# Patient Record
Sex: FEMALE | Race: WHITE | NOT HISPANIC OR LATINO | Employment: UNEMPLOYED | URBAN - METROPOLITAN AREA
[De-identification: names, ages, dates, MRNs, and addresses within clinical notes are randomized per-mention and may not be internally consistent; named-entity substitution may affect disease eponyms.]

---

## 2017-02-06 ENCOUNTER — CONVERSION ENCOUNTER (OUTPATIENT)
Dept: MAMMOGRAPHY | Facility: HOSPITAL | Age: 57
End: 2017-02-06

## 2018-03-26 ENCOUNTER — CONVERSION ENCOUNTER (OUTPATIENT)
Dept: MAMMOGRAPHY | Facility: HOSPITAL | Age: 58
End: 2018-03-26

## 2019-06-04 ENCOUNTER — HOSPITAL ENCOUNTER (OUTPATIENT)
Dept: MAMMOGRAPHY | Facility: HOSPITAL | Age: 59
Discharge: HOME | End: 2019-06-04
Attending: FAMILY MEDICINE

## 2020-06-25 ENCOUNTER — HOSPITAL ENCOUNTER (OUTPATIENT)
Dept: MAMMOGRAPHY | Facility: HOSPITAL | Age: 60
Discharge: HOME | End: 2020-06-25
Attending: FAMILY MEDICINE

## 2021-05-23 ENCOUNTER — TRANSCRIBE ORDERS (OUTPATIENT)
Dept: ADMINISTRATIVE | Facility: HOSPITAL | Age: 61
End: 2021-05-23

## 2021-05-23 DIAGNOSIS — Z12.31 ENCOUNTER FOR SCREENING MAMMOGRAM FOR MALIGNANT NEOPLASM OF BREAST: ICD-10-CM

## 2021-05-23 DIAGNOSIS — Z78.0 POST-MENOPAUSAL: Primary | ICD-10-CM

## 2021-05-23 DIAGNOSIS — Z12.31 ENCOUNTER FOR MAMMOGRAM TO ESTABLISH BASELINE MAMMOGRAM: ICD-10-CM

## 2021-06-28 ENCOUNTER — HOSPITAL ENCOUNTER (OUTPATIENT)
Dept: MAMMOGRAPHY | Facility: HOSPITAL | Age: 61
Discharge: HOME | End: 2021-06-28

## 2021-06-28 ENCOUNTER — HOSPITAL ENCOUNTER (OUTPATIENT)
Dept: BONE DENSITY | Facility: HOSPITAL | Age: 61
Discharge: HOME | End: 2021-06-28

## 2021-06-28 DIAGNOSIS — Z12.31 ENCOUNTER FOR SCREENING MAMMOGRAM FOR MALIGNANT NEOPLASM OF BREAST: ICD-10-CM

## 2021-06-28 DIAGNOSIS — Z78.0 POST-MENOPAUSAL: ICD-10-CM

## 2021-06-28 PROCEDURE — 77067 SCR MAMMO BI INCL CAD: CPT

## 2021-06-28 PROCEDURE — 77080 DXA BONE DENSITY AXIAL: CPT

## 2022-04-29 ENCOUNTER — TRANSCRIBE ORDERS (OUTPATIENT)
Dept: ADMINISTRATIVE | Facility: HOSPITAL | Age: 62
End: 2022-04-29

## 2022-04-29 DIAGNOSIS — Z12.31 VISIT FOR SCREENING MAMMOGRAM: Primary | ICD-10-CM

## 2022-06-29 ENCOUNTER — HOSPITAL ENCOUNTER (OUTPATIENT)
Dept: MAMMOGRAPHY | Facility: HOSPITAL | Age: 62
Discharge: HOME | End: 2022-06-29
Admitting: NURSE PRACTITIONER

## 2022-06-29 DIAGNOSIS — Z12.31 VISIT FOR SCREENING MAMMOGRAM: ICD-10-CM

## 2022-06-29 PROCEDURE — 77067 SCR MAMMO BI INCL CAD: CPT

## 2024-04-19 ENCOUNTER — TRANSCRIBE ORDERS (OUTPATIENT)
Dept: ADMINISTRATIVE | Facility: HOSPITAL | Age: 64
End: 2024-04-19
Payer: COMMERCIAL

## 2024-04-19 DIAGNOSIS — Z12.31 VISIT FOR SCREENING MAMMOGRAM: Primary | ICD-10-CM

## 2024-04-19 DIAGNOSIS — Z78.0 POST-MENOPAUSAL: ICD-10-CM

## 2024-04-23 ENCOUNTER — TRANSCRIBE ORDERS (OUTPATIENT)
Dept: DIABETES SERVICES | Facility: HOSPITAL | Age: 64
End: 2024-04-23
Payer: COMMERCIAL

## 2024-04-23 DIAGNOSIS — E11.9 TYPE 2 DIABETES MELLITUS WITHOUT COMPLICATION, UNSPECIFIED WHETHER LONG TERM INSULIN USE: Primary | ICD-10-CM

## 2024-06-12 NOTE — PROGRESS NOTES
Chief Complaint  Diabetes (New Patient, Establish care. )    Referred By: Taylor Brown*    Subjective          Caitlyn Nugent presents to [facility] for diabetes medication management    History of Present Illness    Visit type:  to establish care  Diabetes type:  Type 2  Age at time of dx/Year of dx/Number of years: reports she was diagnosed with type 2 diabetes at least 25 years ago  Family History of Diabetes: Mother and father  Current diabetes status/concerns/issues: Reports she was referred to our clinic by her primary care provider for management of her diabetes. Reports she is checking her glucose 1-2 times per day and her gluocose is running 62-195mg/dl. States she has been having nocturnal hypoglycemia. States she is symptomatic with the hypoglycemia with symptoms of shaky and diaphoresis. She brought a glucose log and she had hypoglycemia of 44 on 3/31/24 at 3:31 am, 62 on 4/16/24 at 8:30am, 56 on 4/17/24 at 08:30am,  65 on 4/11/24 at 4 pm, 67 on 3/28/24 at 9am.  States she would like to have a CGM for closer monitoring of her glucose levels.  States her A1c was 9% and she was able to get it down to 6% with diet, exercise and wt loss.   Other current health concerns: states she has been having hypotension. States her cardiologist dropped her cardizem from 240mg qd to 120mg qd. States she was placed on cymbalta for the neuropathy in her feet. She reports it has helped.   Current Diabetes symptoms:    Polyuria: No   Polydipsia: Yes     Polyphagia: No   Blurred vision: No   Excessive fatigue: Yes some  Known Diabetes complications:  Neuropathy: Numbness and Tingling; Location: Feet  Renal:  No labs available for review will call PCP for labs  Eyes: No current eye exam available in record; Location: N/A; Last Eye Exam:  7/23 ; Location: [facility]  Amputation/Wounds: None  GI: Constipation and Diarrhea  Cardiovascular: Hypertension and Hyperlipidemia  ED:  N/A  Other: None  Hospitalizations/ED/911 secondary to DM?  No  Hypoglycemia:  Level 1 hypoglycemia (54 mg/dL - 70 mg/dL); Frequency - multiple times per wk and Level 2 (less than 54 mg/dL); Frequency - one episode of 44   Hypoglycemia Symptoms:  shaking/tremors, dizziness, sweating, weakness, and lightheadedness  Current Diabetes treatment:  glucophage 1000mg bid, Ozempic 1mg once wk, lantus 30 units qd  Prior diabetes treatments:  januvia, basaglar  Using ACEI or ARB: Yes, Lisinopril, Managed by other provider  Using Statin: Yes, atorvastatin, Managed by other provider  Blood glucose device:  Meter  Blood glucose monitoring frequency:   2-4 times daily  Blood glucose range/average:      62-195mg/dl.  Glucose Source: BG Logs  Dietary behavior:  Limits high carb/sweet foods, Avoids sugary drinks, Number of meals each day - 2; Number of snacks each day - 1-2  Activity/Exercise:  None  Last Foot Exam: None  Diabetes Education Hx: Diabetes Nutrition Counseling  Social Determinants of Health: None    History reviewed. No pertinent past medical history.  Past Surgical History:   Procedure Laterality Date     SECTION      x2    POSTPARTUM TUBAL LIGATION      TONSILLECTOMY       Family History   Problem Relation Age of Onset    Breast cancer Mother     Diabetes Mother     Lung cancer Mother     Diabetes Father      Social History     Socioeconomic History    Marital status:    Tobacco Use    Smoking status: Never   Vaping Use    Vaping status: Never Used   Substance and Sexual Activity    Alcohol use: Never    Drug use: Never    Sexual activity: Not Currently     No Known Allergies    Current Outpatient Medications:     atorvastatin (LIPITOR) 20 MG tablet, Take 1 tablet by mouth Daily., Disp: , Rfl:     calcium carbonate (Calcium 600) 600 MG tablet, Take 1 tablet by mouth Daily., Disp: , Rfl:     carvedilol (COREG) 25 MG tablet, Take 1 tablet by mouth 2 (Two) Times a Day With Meals., Disp: , Rfl:      "cholecalciferol (D3-1000) 25 MCG (1000 UT) tablet, Take 1 tablet by mouth Daily., Disp: , Rfl:     Cyanocobalamin (B-12) 1000 MCG capsule, Take 1,000 mcg by mouth Daily., Disp: , Rfl:     dilTIAZem CD (CARDIZEM CD) 120 MG 24 hr capsule, Take 1 capsule by mouth Daily., Disp: , Rfl:     DULoxetine (CYMBALTA) 60 MG capsule, Take 1 capsule by mouth Daily., Disp: , Rfl:     Eliquis 5 MG tablet tablet, Take 1 tablet by mouth 2 (Two) Times a Day., Disp: , Rfl:     insulin glargine (Lantus) 100 UNIT/ML injection, Inject 30 Units under the skin into the appropriate area as directed Every Night., Disp: , Rfl:     Jardiance 25 MG tablet tablet, Take 1 tablet by mouth Daily., Disp: , Rfl:     levocetirizine (XYZAL) 5 MG tablet, Take 1 tablet by mouth Daily., Disp: , Rfl:     lisinopril (PRINIVIL,ZESTRIL) 2.5 MG tablet, Take 0.5 tablets by mouth Daily., Disp: , Rfl:     metFORMIN (GLUCOPHAGE) 1000 MG tablet, Take 1 tablet by mouth 2 (Two) Times a Day With Meals., Disp: , Rfl:     multivitamin with minerals (Centrum Silver 50+Women) tablet tablet, Take 1 tablet by mouth Daily., Disp: , Rfl:     omeprazole (priLOSEC) 40 MG capsule, Take 1 capsule by mouth Daily., Disp: , Rfl:     Ozempic, 1 MG/DOSE, 4 MG/3ML solution pen-injector, Inject 1 mg under the skin into the appropriate area as directed 1 (One) Time Per Week., Disp: , Rfl:     ZINC METHIONATE PO, Take 50 mg by mouth Daily., Disp: , Rfl:     Continuous Glucose Sensor (FreeStyle Steffi 3 Sensor) misc, Use 1 each Every 14 (Fourteen) Days., Disp: 2 each, Rfl: 5    Objective     Vitals:    06/13/24 0956   BP: 121/68   Pulse: 67   SpO2: 97%   Weight: 88.6 kg (195 lb 6.4 oz)   Height: 172.7 cm (68\")     Body mass index is 29.71 kg/m².    Physical Exam  Constitutional:       Appearance: Normal appearance. She is normal weight.      Comments: Overweight (BMI 25 - 29.9) Pt Current BMI = 29.71     HENT:      Head: Normocephalic and atraumatic.      Right Ear: External ear normal.      " Left Ear: External ear normal.      Nose: Nose normal.   Eyes:      Extraocular Movements: Extraocular movements intact.      Conjunctiva/sclera: Conjunctivae normal.   Pulmonary:      Effort: Pulmonary effort is normal.   Musculoskeletal:         General: Normal range of motion.      Cervical back: Normal range of motion.   Skin:     General: Skin is warm and dry.   Neurological:      General: No focal deficit present.      Mental Status: She is alert and oriented to person, place, and time. Mental status is at baseline.   Psychiatric:         Mood and Affect: Mood normal.         Behavior: Behavior normal.         Thought Content: Thought content normal.         Judgment: Judgment normal.             Result Review :   The following data was reviewed by: WINNIE Alcazar on 06/13/2024:    Most Recent A1C          6/13/2024    10:21   HGBA1C Most Recent   Hemoglobin A1C 6.3        A1C Last 3 Results          6/13/2024    10:21   HGBA1C Last 3 Results   Hemoglobin A1C 6.3      A1c collected in the office today is 6.3%, indicating Controlled Type II diabetes.    Glucose   Date Value Ref Range Status   06/13/2024 113 (H) 70 - 99 mg/dL Final     Comment:     Serial Number: [id number]:  [id number]     Point of care glucose in the office today is within normal limits for nonfasting glucose                Assessment: Reports she was referred to our clinic by her primary care provider for management of her diabetes. Reports she is checking her glucose 1-2 times per day and her gluocose is running 62-195mg/dl. States she has been having nocturnal hypoglycemia. States she is symptomatic with the hypoglycemia with symptoms of shaky and diaphoresis. She brought a glucose log and she had hypoglycemia of 44 on 3/31/24 at 3:31 am, 62 on 4/16/24 at 8:30am, 56 on 4/17/24 at 08:30am,  65 on 4/11/24 at 4 pm, 67 on 3/28/24 at 9am.  States she would like to have a CGM for closer monitoring of her glucose levels.  States  her A1c was 9% and she was able to get it down to 6% with diet, exercise and wt loss.  States she has met with a dietitian in the past.  Admits she is watching her carbohydrate and sugar intake.  Her A1c in the office today is 6.3% indicating controlled type 2 diabetes.      Diagnoses and all orders for this visit:    1. Controlled type 2 diabetes mellitus with hypoglycemia, with long-term current use of insulin (Primary)  -     POC Glycosylated Hemoglobin (Hb A1C)  -     Continuous Glucose Sensor (FreeStyle Mahi 3 Sensor) misc; Use 1 each Every 14 (Fourteen) Days.  Dispense: 2 each; Refill: 5    2. Overweight (BMI 25.0-29.9)    3. Type 2 diabetes mellitus with diabetic neuropathy, with long-term current use of insulin    Other orders  -     POC Glucose        Plan: A sample mahi 3 was placed on the right posterior arm and the john was downloaded for the patient in the office today.  A booklet on the mahi 3 was given to the patient and a prescription was sent to her pharmacy.  Scheduled a 6-week follow-up and we will review her CGM at that time.    The patient will monitor her blood glucose levels per CGM.  If she develops problematic hyperglycemia or hypoglycemia or adverse drug reactions, she will contact the office for further instructions.        Follow Up     Return in about 6 weeks (around 7/25/2024) for CGM Follow Up, Medication Mgmt.    Patient was given instructions and counseling regarding her condition or for health maintenance advice. Please see specific information pulled into the AVS if appropriate.     Sarahi Galaviz, WINNIE  06/13/2024      Dictated Utilizing Dragon Dictation.  Please note that portions of this note were completed with a voice recognition program.  Part of this note may be an electronic transcription/translation of spoken language to printed text using the Dragon Dictation System.

## 2024-06-13 ENCOUNTER — OFFICE VISIT (OUTPATIENT)
Dept: DIABETES SERVICES | Facility: HOSPITAL | Age: 64
End: 2024-06-13
Payer: COMMERCIAL

## 2024-06-13 ENCOUNTER — PRIOR AUTHORIZATION (OUTPATIENT)
Dept: DIABETES SERVICES | Facility: HOSPITAL | Age: 64
End: 2024-06-13

## 2024-06-13 VITALS
SYSTOLIC BLOOD PRESSURE: 121 MMHG | HEART RATE: 67 BPM | OXYGEN SATURATION: 97 % | WEIGHT: 195.4 LBS | BODY MASS INDEX: 29.61 KG/M2 | HEIGHT: 68 IN | DIASTOLIC BLOOD PRESSURE: 68 MMHG

## 2024-06-13 DIAGNOSIS — E11.40 TYPE 2 DIABETES MELLITUS WITH DIABETIC NEUROPATHY, WITH LONG-TERM CURRENT USE OF INSULIN: ICD-10-CM

## 2024-06-13 DIAGNOSIS — Z79.4 TYPE 2 DIABETES MELLITUS WITH DIABETIC NEUROPATHY, WITH LONG-TERM CURRENT USE OF INSULIN: ICD-10-CM

## 2024-06-13 DIAGNOSIS — E11.649 CONTROLLED TYPE 2 DIABETES MELLITUS WITH HYPOGLYCEMIA, WITH LONG-TERM CURRENT USE OF INSULIN: Primary | ICD-10-CM

## 2024-06-13 DIAGNOSIS — E11.65 UNCONTROLLED TYPE 2 DIABETES MELLITUS WITH HYPERGLYCEMIA: ICD-10-CM

## 2024-06-13 DIAGNOSIS — Z79.4 CONTROLLED TYPE 2 DIABETES MELLITUS WITH HYPOGLYCEMIA, WITH LONG-TERM CURRENT USE OF INSULIN: Primary | ICD-10-CM

## 2024-06-13 DIAGNOSIS — E66.3 OVERWEIGHT (BMI 25.0-29.9): ICD-10-CM

## 2024-06-13 LAB
EXPIRATION DATE: ABNORMAL
GLUCOSE BLDC GLUCOMTR-MCNC: 113 MG/DL (ref 70–99)
HBA1C MFR BLD: 6.3 % (ref 4.5–5.7)
Lab: ABNORMAL

## 2024-06-13 PROCEDURE — G0463 HOSPITAL OUTPT CLINIC VISIT: HCPCS | Performed by: NURSE PRACTITIONER

## 2024-06-13 PROCEDURE — 99204 OFFICE O/P NEW MOD 45 MIN: CPT | Performed by: NURSE PRACTITIONER

## 2024-06-13 PROCEDURE — 82948 REAGENT STRIP/BLOOD GLUCOSE: CPT | Performed by: NURSE PRACTITIONER

## 2024-06-13 PROCEDURE — 83036 HEMOGLOBIN GLYCOSYLATED A1C: CPT | Performed by: NURSE PRACTITIONER

## 2024-06-13 RX ORDER — CARVEDILOL 25 MG/1
25 TABLET ORAL 2 TIMES DAILY WITH MEALS
COMMUNITY

## 2024-06-13 RX ORDER — LEVOCETIRIZINE DIHYDROCHLORIDE 5 MG/1
5 TABLET, FILM COATED ORAL DAILY
COMMUNITY

## 2024-06-13 RX ORDER — SEMAGLUTIDE 1.34 MG/ML
1 INJECTION, SOLUTION SUBCUTANEOUS WEEKLY
COMMUNITY
Start: 2024-06-06

## 2024-06-13 RX ORDER — MULTIPLE VITAMINS W/ MINERALS TAB 9MG-400MCG
1 TAB ORAL DAILY
COMMUNITY

## 2024-06-13 RX ORDER — OMEPRAZOLE 40 MG/1
40 CAPSULE, DELAYED RELEASE ORAL DAILY
COMMUNITY

## 2024-06-13 RX ORDER — PHENOL 1.4 %
600 AEROSOL, SPRAY (ML) MUCOUS MEMBRANE DAILY
COMMUNITY

## 2024-06-13 RX ORDER — MELATONIN
1000 DAILY
COMMUNITY

## 2024-06-13 RX ORDER — DULOXETIN HYDROCHLORIDE 60 MG/1
60 CAPSULE, DELAYED RELEASE ORAL DAILY
COMMUNITY

## 2024-06-13 RX ORDER — INSULIN GLARGINE 100 [IU]/ML
30 INJECTION, SOLUTION SUBCUTANEOUS NIGHTLY
COMMUNITY

## 2024-06-13 RX ORDER — ATORVASTATIN CALCIUM 20 MG/1
20 TABLET, FILM COATED ORAL DAILY
COMMUNITY

## 2024-06-13 RX ORDER — BLOOD-GLUCOSE SENSOR
1 EACH MISCELLANEOUS
Qty: 2 EACH | Refills: 5 | Status: SHIPPED | OUTPATIENT
Start: 2024-06-13

## 2024-06-13 RX ORDER — APIXABAN 5 MG/1
5 TABLET, FILM COATED ORAL 2 TIMES DAILY
COMMUNITY

## 2024-06-13 RX ORDER — LISINOPRIL 2.5 MG/1
0.5 TABLET ORAL DAILY
COMMUNITY
Start: 2024-06-11

## 2024-06-13 RX ORDER — DILTIAZEM HYDROCHLORIDE 120 MG/1
120 CAPSULE, COATED, EXTENDED RELEASE ORAL DAILY
COMMUNITY

## 2024-06-13 RX ORDER — EMPAGLIFLOZIN 25 MG/1
25 TABLET, FILM COATED ORAL DAILY
COMMUNITY

## 2024-06-13 NOTE — TELEPHONE ENCOUNTER
PA was sent to Plan and was approved.    Key: [id number]    FreeStyle Steffi 3 Sensor  PA Case ID #: [id number]  Rx #: [id number]      Outcome  Approved today  PA Case: [id number], Status: Approved, Coverage Starts on: 6/13/2024 12:00:00 AM, Coverage Ends on: 6/13/2025 12:00:00 AM.  Authorization Expiration Date: 6/12/2025

## 2024-06-14 ENCOUNTER — PATIENT ROUNDING (BHMG ONLY) (OUTPATIENT)
Dept: DIABETES SERVICES | Facility: HOSPITAL | Age: 64
End: 2024-06-14
Payer: COMMERCIAL

## 2024-06-14 NOTE — PROGRESS NOTES
June 14, 2024    Hello, may I speak with Caitlyn Nugent?    My name is Dian      I am  with [facility]  [address] [place] [address] [place] [address] [phone].    Before we get started may I verify your date of birth? 1960    I am calling to officially welcome you to our practice and ask about your recent visit. Is this a good time to talk? no    Tell me about your visit with us. What things went well?  Left voicemail per script welcoming the pt to the clinic with clinic information.        We're always looking for ways to make our patients' experiences even better. Do you have recommendations on ways we may improve?  no    Overall were you satisfied with your first visit to our practice? yes       I appreciate you taking the time to speak with me today. Is there anything else I can do for you? no      Thank you, and have a great day.

## 2024-07-01 ENCOUNTER — OFFICE VISIT (OUTPATIENT)
Dept: SURGERY | Facility: CLINIC | Age: 64
End: 2024-07-01
Payer: COMMERCIAL

## 2024-07-01 ENCOUNTER — PREP FOR SURGERY (OUTPATIENT)
Dept: OTHER | Facility: HOSPITAL | Age: 64
End: 2024-07-01
Payer: COMMERCIAL

## 2024-07-01 VITALS
HEART RATE: 71 BPM | HEIGHT: 68 IN | DIASTOLIC BLOOD PRESSURE: 83 MMHG | WEIGHT: 192 LBS | BODY MASS INDEX: 29.1 KG/M2 | SYSTOLIC BLOOD PRESSURE: 140 MMHG

## 2024-07-01 DIAGNOSIS — Z12.11 SCREENING FOR MALIGNANT NEOPLASM OF COLON: Primary | ICD-10-CM

## 2024-07-01 PROCEDURE — S0285 CNSLT BEFORE SCREEN COLONOSC: HCPCS | Performed by: NURSE PRACTITIONER

## 2024-07-01 RX ORDER — SODIUM CHLORIDE 0.9 % (FLUSH) 0.9 %
3 SYRINGE (ML) INJECTION EVERY 12 HOURS SCHEDULED
OUTPATIENT
Start: 2024-07-01

## 2024-07-01 RX ORDER — POLYETHYLENE GLYCOL 3350 17 G/17G
POWDER, FOR SOLUTION ORAL
Qty: 238 PACKET | Refills: 0 | Status: SHIPPED | OUTPATIENT
Start: 2024-07-01

## 2024-07-01 RX ORDER — CHLORAL HYDRATE 500 MG
CAPSULE ORAL
COMMUNITY

## 2024-07-01 RX ORDER — SODIUM CHLORIDE 0.9 % (FLUSH) 0.9 %
10 SYRINGE (ML) INJECTION AS NEEDED
OUTPATIENT
Start: 2024-07-01

## 2024-07-01 RX ORDER — SODIUM CHLORIDE 9 MG/ML
40 INJECTION, SOLUTION INTRAVENOUS AS NEEDED
OUTPATIENT
Start: 2024-07-01

## 2024-07-01 NOTE — PROGRESS NOTES
Chief Complaint: Colonoscopy    Subjective      Colonoscopy consultation       History of Present Illness  Caitlyn Nugent is a 63 y.o. female presents to [facility] for colonoscopy consultation.    Patient presents today on referral from Taylor Galvin for colonoscopy consultation.  Patient denies any abdominal pain, change in bowel habit, or rectal bleeding.  Denies any family history of colorectal cancer.    Patient reports that she stopped Ozempic 3 weeks ago due to pain and constipation which has resolved.    She denies PRISCILA.    Denies taking a GLP-1 receptors.    Admits to taking Eliquis daily for A-fib.  She is under the care of Lore ZHOU.  Get cardiac clearance prior to the procedure.    : Colonoscopy (Mata): Normal colon.    : colonoscopy (Mata): normal colon.   IObjective     Past Medical History:   Diagnosis Date    Cancer Basal cancer on my face.    Diabetes mellitus     Hypertension        Past Surgical History:   Procedure Laterality Date     SECTION      x2    POSTPARTUM TUBAL LIGATION      TONSILLECTOMY         Outpatient Medications Marked as Taking for the 24 encounter (Office Visit) with Binta Quesada, APRDELIA   Medication Sig Dispense Refill    atorvastatin (LIPITOR) 20 MG tablet Take 1 tablet by mouth Daily.      calcium carbonate (Calcium 600) 600 MG tablet Take 1 tablet by mouth Daily.      carvedilol (COREG) 25 MG tablet Take 1 tablet by mouth 2 (Two) Times a Day With Meals.      cholecalciferol (D3-1000) 25 MCG (1000 UT) tablet Take 1 tablet by mouth Daily.      Continuous Glucose Sensor (FreeStyle Steffi 3 Sensor) [facility] Use 1 each Every 14 (Fourteen) Days. 2 each 5    Cyanocobalamin (B-12) 1000 MCG capsule Take 1,000 mcg by mouth Daily.      dilTIAZem CD (CARDIZEM CD) 120 MG 24 hr capsule Take 1 capsule by mouth Daily.      DULoxetine (CYMBALTA) 60 MG capsule Take 1 capsule by mouth Daily.      Eliquis 5 MG tablet tablet Take 1  "tablet by mouth 2 (Two) Times a Day.      insulin glargine (Lantus) 100 UNIT/ML injection Inject 30 Units under the skin into the appropriate area as directed Every Night.      Jardiance 25 MG tablet tablet Take 1 tablet by mouth Daily.      levocetirizine (XYZAL) 5 MG tablet Take 1 tablet by mouth Daily.      metFORMIN (GLUCOPHAGE) 1000 MG tablet Take 1 tablet by mouth 2 (Two) Times a Day With Meals.      multivitamin with minerals (Centrum Silver 50+Women) tablet tablet Take 1 tablet by mouth Daily.      omeprazole (priLOSEC) 40 MG capsule Take 1 capsule by mouth Daily.      ZINC METHIONATE PO Take 50 mg by mouth Daily.         No Known Allergies     Family History   Problem Relation Age of Onset    Breast cancer Mother     Diabetes Mother     Lung cancer Mother     Cancer Mother         Lung, breast, brain cancer    Hypertension Mother     Diabetes Father     Hypertension Father     Kidney disease Father         Was on dialysis 8 yrs.       Social History     Socioeconomic History    Marital status:    Tobacco Use    Smoking status: Never    Smokeless tobacco: Never   Vaping Use    Vaping status: Never Used   Substance and Sexual Activity    Alcohol use: Not Currently    Drug use: Never    Sexual activity: Not Currently     Partners: Male     Birth control/protection: None, Tubal ligation       Review of Systems   Constitutional:  Negative for chills and fever.   Gastrointestinal:  Negative for abdominal distention, abdominal pain, anal bleeding, blood in stool, constipation, diarrhea and rectal pain.        Vital Signs:   /83 (BP Location: Right arm, Patient Position: Sitting, Cuff Size: Adult)   Pulse 71   Ht 172.7 cm (68\")   Wt 87.1 kg (192 lb)   BMI 29.19 kg/m²      Physical Exam  Vitals and nursing note reviewed.   Constitutional:       General: She is not in acute distress.     Appearance: Normal appearance. She is not ill-appearing.   HENT:      Head: Normocephalic and atraumatic. "   Cardiovascular:      Rate and Rhythm: Normal rate.   Pulmonary:      Effort: Pulmonary effort is normal.      Breath sounds: No stridor.   Abdominal:      Palpations: Abdomen is soft.      Tenderness: There is no guarding.   Musculoskeletal:         General: No deformity. Normal range of motion.   Skin:     General: Skin is warm and dry.      Coloration: Skin is not jaundiced.   Neurological:      General: No focal deficit present.      Mental Status: She is alert and oriented to person, place, and time.   Psychiatric:         Mood and Affect: Mood normal.         Thought Content: Thought content normal.          Result Review :          []  Laboratory  []  Radiology  []  Pathology  []  Microbiology  []  EKG/Telemetry   []  Cardiology/Vascular   []  Old records  I spent 15 minutes caring for Caitlyn on this date of service. This time includes time spent by me in the following activities: reviewing tests, obtaining and/or reviewing a separately obtained history, performing a medically appropriate examination and/or evaluation, ordering medications, tests, or procedures, and documenting information in the medical record     Assessment and Plan    Diagnoses and all orders for this visit:    1. Screening for malignant neoplasm of colon (Primary)    Other orders  -     polyethylene glycol (MIRALAX) 17 g packet; Take as directed.  Instructions given in office.  Dispense: 238 g bottle  Dispense: 238 packet; Refill: 0    Hold Eliquis starting 8/3/2024.    Cardiac clearance from Lore Epps.    Lantus: 8/5: 0 units; 8/4: 10 units      Follow Up   Return for Schedule colonoscopy with Dr. Baires on 8/5/2024 [facility].    Hospital arrival time: 0930    Possible risks/complications, benefits, and alternatives to surgical or invasive procedures have been explained to patient and/or legal guardian.    Patient has been evaluated and can tolerate anesthesia and/or sedation. Risks, benefits, and alternatives to  anesthesia and sedation have been explained to the patient and/or legal guardian. Patient verbalizes understanding and is willing to proceed with the above plan.     Patient was given instructions and counseling regarding her condition or for health maintenance advice. Please see specific information pulled into the AVS if appropriate.

## 2024-07-22 ENCOUNTER — HOSPITAL ENCOUNTER (OUTPATIENT)
Dept: MAMMOGRAPHY | Facility: HOSPITAL | Age: 64
Discharge: HOME | End: 2024-07-22
Payer: COMMERCIAL

## 2024-07-22 ENCOUNTER — HOSPITAL ENCOUNTER (OUTPATIENT)
Dept: BONE DENSITY | Facility: HOSPITAL | Age: 64
Discharge: HOME | End: 2024-07-22
Payer: COMMERCIAL

## 2024-07-22 DIAGNOSIS — Z78.0 POST-MENOPAUSAL: ICD-10-CM

## 2024-07-22 DIAGNOSIS — Z12.31 VISIT FOR SCREENING MAMMOGRAM: ICD-10-CM

## 2024-07-22 PROCEDURE — 77080 DXA BONE DENSITY AXIAL: CPT

## 2024-07-22 PROCEDURE — 77067 SCR MAMMO BI INCL CAD: CPT

## 2024-07-22 PROCEDURE — 77063 BREAST TOMOSYNTHESIS BI: CPT

## 2024-07-25 ENCOUNTER — TELEPHONE (OUTPATIENT)
Dept: SURGERY | Facility: CLINIC | Age: 64
End: 2024-07-25
Payer: COMMERCIAL

## 2024-07-25 NOTE — TELEPHONE ENCOUNTER
Pt is aware that we received her clearance back and its ok t old her Eliquis 2 days prior to her scope. Pt V/U.

## 2024-07-30 NOTE — PAT
Reminded of arrival time at    0930AM     , Entrance C of the [facility] hospital. Instructed to bring or have a  over the age of 18 set up to drive you home the day of procedure.    Instructed on clear liquid diet the day before, nothing red or purple. Call with any questions about the prep or if in need of the prep.  Reminded them not to eat or drink anything am of procedure unless its a sip of water with medications.      PT TO STOP ELIQUIS FRIDAY BEFORE

## 2024-07-31 NOTE — PROGRESS NOTES
Chief Complaint  Diabetes (Follow up, med mgt, CGM albina, Stopped Ozempic 06/19)    Referred By: Abel Garcia MD    Subjective          Caitlyn Nugent presents to [facility] for diabetes medication management    History of Present Illness    Visit type:  follow-up  Diabetes type:  Type 2  Current diabetes status/concerns/issues:  Reports she discontinued Ozempic due to constipation. States it was so bad she thought she was going to have to go to the hospital. She has been off Ozempic since 6/19/24. Reports the constipation resolved for the most part. States her appetite has increased since being off Ozempic. States her blood sugar has been in the low 100s on awakening. States she is watching her intake of sugar. States the only sugar she eats is natural sugar in fruit.   Other health concerns: No new health concerns  Current Diabetes symptoms:    Polyuria: Yes     Polydipsia: Yes     Polyphagia: No   Blurred vision: No   Excessive fatigue: No  Known Diabetes complications:  Neuropathy: Numbness and Tingling; Location: Feet  Renal:  No labs available for review will call PCP for labs  Eyes: No current eye exam available in record; Location: N/A; Last Eye Exam:  7/23 ; Location: [facility]  Amputation/Wounds: None  GI: Constipation and Diarrhea  Cardiovascular: Hypertension and Hyperlipidemia  ED: N/A  Other: None  Hypoglycemia:  None reported at this time and 0% per CGM  Hypoglycemia Symptoms:  No hypoglycemia at this time  Current diabetes treatment:  glucophage 1000mg bid, lantus 20 units qd   Blood glucose device:  Steffi CGM  Blood glucose monitoring frequency:  Continuous per CGM  Blood glucose range/average:  The 14-day sensor report shows an average glucose of 159 mg/dL, with 72% in target range ( mgdL), 22% in the high range (181-250 mg/dL), 6% in the very high range (>250 mg/dL), 0% in the low range (54-70 mg/dL) and 0% in the very low range (<54 mg/dL).    Glucose Source: Device Reviewed  Dietary behavior:  Limits high carb/sweet foods, Avoids sugary drinks, Number of meals each day - 2; Number of snacks each day - 1-2  Activity/Exercise:  None  Past Medical History:   Diagnosis Date    Cancer Basal cancer on my face.    Diabetes mellitus     Hypertension      Past Surgical History:   Procedure Laterality Date     SECTION      x2    POSTPARTUM TUBAL LIGATION      TONSILLECTOMY       Family History   Problem Relation Age of Onset    Breast cancer Mother     Diabetes Mother     Lung cancer Mother     Cancer Mother         Lung, breast, brain cancer    Hypertension Mother     Diabetes Father     Hypertension Father     Kidney disease Father         Was on dialysis 8 yrs.     Social History     Socioeconomic History    Marital status:    Tobacco Use    Smoking status: Never    Smokeless tobacco: Never   Vaping Use    Vaping status: Never Used   Substance and Sexual Activity    Alcohol use: Not Currently    Drug use: Never    Sexual activity: Not Currently     Partners: Male     Birth control/protection: None, Tubal ligation     No Known Allergies    Current Outpatient Medications:     atorvastatin (LIPITOR) 20 MG tablet, Take 1 tablet by mouth Daily., Disp: , Rfl:     calcium carbonate (Calcium 600) 600 MG tablet, Take 1 tablet by mouth Daily., Disp: , Rfl:     carvedilol (COREG) 25 MG tablet, Take 1 tablet by mouth 2 (Two) Times a Day With Meals., Disp: , Rfl:     cholecalciferol (D3-1000) 25 MCG (1000 UT) tablet, Take 1 tablet by mouth Daily., Disp: , Rfl:     Continuous Glucose Sensor (FreeStyle Steffi 3 Sensor) misc, Use 1 each Every 14 (Fourteen) Days., Disp: 2 each, Rfl: 5    Cyanocobalamin (B-12) 1000 MCG capsule, Take 1,000 mcg by mouth Daily., Disp: , Rfl:     dilTIAZem CD (CARDIZEM CD) 120 MG 24 hr capsule, Take 1 capsule by mouth Daily., Disp: , Rfl:     DULoxetine (CYMBALTA) 60 MG capsule, Take 1 capsule by mouth Daily., Disp: , Rfl:      "Eliquis 5 MG tablet tablet, Take 1 tablet by mouth 2 (Two) Times a Day., Disp: , Rfl:     insulin glargine (Lantus) 100 UNIT/ML injection, Inject 20 Units under the skin into the appropriate area as directed Every Night., Disp: , Rfl:     Jardiance 25 MG tablet tablet, Take 1 tablet by mouth Daily., Disp: , Rfl:     levocetirizine (XYZAL) 5 MG tablet, Take 1 tablet by mouth Daily., Disp: , Rfl:     metFORMIN (GLUCOPHAGE) 1000 MG tablet, Take 1 tablet by mouth 2 (Two) Times a Day With Meals., Disp: , Rfl:     multivitamin with minerals (Centrum Silver 50+Women) tablet tablet, Take 1 tablet by mouth Daily., Disp: , Rfl:     omeprazole (priLOSEC) 40 MG capsule, Take 1 capsule by mouth Daily., Disp: , Rfl:     polyethylene glycol (MIRALAX) 17 g packet, Take as directed.  Instructions given in office.  Dispense: 238 g bottle, Disp: 238 packet, Rfl: 0    ZINC METHIONATE PO, Take 50 mg by mouth Daily., Disp: , Rfl:     Objective     Vitals:    08/01/24 1057   BP: 112/76   Pulse: 71   SpO2: 98%   Weight: 86.6 kg (191 lb)   Height: 172.7 cm (68\")     Body mass index is 29.04 kg/m².    Physical Exam  Constitutional:       Appearance: Normal appearance. She is normal weight.      Comments: Overweight (BMI 25 - 29.9) Pt Current BMI = 29.04     HENT:      Head: Normocephalic and atraumatic.      Right Ear: External ear normal.      Left Ear: External ear normal.      Nose: Nose normal.   Eyes:      Extraocular Movements: Extraocular movements intact.      Conjunctiva/sclera: Conjunctivae normal.   Pulmonary:      Effort: Pulmonary effort is normal.   Musculoskeletal:         General: Normal range of motion.      Cervical back: Normal range of motion.   Skin:     General: Skin is warm and dry.   Neurological:      General: No focal deficit present.      Mental Status: She is alert and oriented to person, place, and time. Mental status is at baseline.   Psychiatric:         Mood and Affect: Mood normal.         Behavior: Behavior " normal.         Thought Content: Thought content normal.         Judgment: Judgment normal.             Result Review :   The following data was reviewed by: WINNIE Alcazar on 08/01/2024:    Most Recent A1C          8/1/2024    11:09   HGBA1C Most Recent   Hemoglobin A1C 6.5        A1C Last 3 Results          6/13/2024    10:21 8/1/2024    11:09   HGBA1C Last 3 Results   Hemoglobin A1C 6.3  6.5      A1c collected in the office today is 6.5%, indicating Controlled Type II diabetes.  This result is up from the prior result of 6.3% collected on 6/13/24     Glucose   Date Value Ref Range Status   08/01/2024 97 70 - 99 mg/dL Final     Comment:     Serial Number: [id number]:  [id number]     Point of care glucose in the office today is within normal limits for nonfasting glucose                Assessment: Pt is here today for a 6 wk follow up on her diabetes.  Reports she discontinued Ozempic due to constipation. States it was so bad she thought she was going to have to go to the hospital. She has been off Ozempic since 6/19/24. Reports the constipation resolved for the most part. States her appetite has increased since being off Ozempic. States her blood sugar has been in the low 100s on awakening. States she is watching her intake of sugar. States the only sugar she eats is natural sugar in fruit. Reviewed CGM report in the office today. The 14-day sensor report shows an average glucose of 159 mg/dL, with 72% in target range ( mgdL), 22% in the high range (181-250 mg/dL), 6% in the very high range (>250 mg/dL), 0% in the low range (54-70 mg/dL) and 0% in the very low range (<54 mg/dL). Her A1c in the office today is 6.5% which is up from her previous A1c of 6.3% in June.       Diagnoses and all orders for this visit:    1. Controlled type 2 diabetes mellitus with hypoglycemia, with long-term current use of insulin (Primary)  -     MicroAlbumin, Urine, Random - Urine, Clean Catch  -     POC  Glycosylated Hemoglobin (Hb A1C)    2. Overweight (BMI 25.0-29.9)    3. Type 2 diabetes mellitus with diabetic neuropathy, with long-term current use of insulin    4. Hemoglobin A1c less than 7.0%    Other orders  -     POC Glucose        Plan: No changes were made to her plan of care today. She is due a urine micro which was ordered at her visit today. Scheduled a 3 month follow up and we will review her CGM and recheck her A1c at that time.     The patient will monitor her blood glucose levels per CGM.  If she develops problematic hyperglycemia or hypoglycemia or adverse drug reactions, she will contact the office for further instructions.        Follow Up     Return in about 3 months (around 11/1/2024) for CGM Follow Up, Medication Mgmt.    Patient was given instructions and counseling regarding her condition or for health maintenance advice. Please see specific information pulled into the AVS if appropriate.     Sarahi Galaviz, WINNIE  08/01/2024      Dictated Utilizing Dragon Dictation.  Please note that portions of this note were completed with a voice recognition program.  Part of this note may be an electronic transcription/translation of spoken language to printed text using the Dragon Dictation System.

## 2024-08-01 ENCOUNTER — OFFICE VISIT (OUTPATIENT)
Dept: DIABETES SERVICES | Facility: HOSPITAL | Age: 64
End: 2024-08-01
Payer: COMMERCIAL

## 2024-08-01 VITALS
HEIGHT: 68 IN | OXYGEN SATURATION: 98 % | BODY MASS INDEX: 28.95 KG/M2 | WEIGHT: 191 LBS | HEART RATE: 71 BPM | SYSTOLIC BLOOD PRESSURE: 112 MMHG | DIASTOLIC BLOOD PRESSURE: 76 MMHG

## 2024-08-01 DIAGNOSIS — Z79.4 TYPE 2 DIABETES MELLITUS WITH DIABETIC NEUROPATHY, WITH LONG-TERM CURRENT USE OF INSULIN: ICD-10-CM

## 2024-08-01 DIAGNOSIS — R73.09 HEMOGLOBIN A1C LESS THAN 7.0%: ICD-10-CM

## 2024-08-01 DIAGNOSIS — E11.40 TYPE 2 DIABETES MELLITUS WITH DIABETIC NEUROPATHY, WITH LONG-TERM CURRENT USE OF INSULIN: ICD-10-CM

## 2024-08-01 DIAGNOSIS — Z79.4 CONTROLLED TYPE 2 DIABETES MELLITUS WITH HYPOGLYCEMIA, WITH LONG-TERM CURRENT USE OF INSULIN: Primary | ICD-10-CM

## 2024-08-01 DIAGNOSIS — E66.3 OVERWEIGHT (BMI 25.0-29.9): ICD-10-CM

## 2024-08-01 DIAGNOSIS — E11.649 CONTROLLED TYPE 2 DIABETES MELLITUS WITH HYPOGLYCEMIA, WITH LONG-TERM CURRENT USE OF INSULIN: Primary | ICD-10-CM

## 2024-08-01 LAB
ALBUMIN UR-MCNC: <1.2 MG/DL
EXPIRATION DATE: 426
GLUCOSE BLDC GLUCOMTR-MCNC: 97 MG/DL (ref 70–99)
HBA1C MFR BLD: 6.5 % (ref 4.5–5.7)
Lab: ABNORMAL

## 2024-08-01 PROCEDURE — 95251 CONT GLUC MNTR ANALYSIS I&R: CPT | Performed by: NURSE PRACTITIONER

## 2024-08-01 PROCEDURE — 82948 REAGENT STRIP/BLOOD GLUCOSE: CPT | Performed by: NURSE PRACTITIONER

## 2024-08-01 PROCEDURE — G0463 HOSPITAL OUTPT CLINIC VISIT: HCPCS | Performed by: NURSE PRACTITIONER

## 2024-08-01 PROCEDURE — 99214 OFFICE O/P EST MOD 30 MIN: CPT | Performed by: NURSE PRACTITIONER

## 2024-08-01 PROCEDURE — 83036 HEMOGLOBIN GLYCOSYLATED A1C: CPT | Performed by: NURSE PRACTITIONER

## 2024-08-01 PROCEDURE — 82043 UR ALBUMIN QUANTITATIVE: CPT | Performed by: NURSE PRACTITIONER

## 2024-08-02 ENCOUNTER — ANESTHESIA EVENT (OUTPATIENT)
Dept: GASTROENTEROLOGY | Facility: HOSPITAL | Age: 64
End: 2024-08-02
Payer: COMMERCIAL

## 2024-08-02 NOTE — ANESTHESIA PREPROCEDURE EVALUATION
Anesthesia Evaluation     Patient summary reviewed and Nursing notes reviewed   NPO Solid Status: > 8 hours  NPO Liquid Status: > 2 hours           Airway   Mallampati: II  TM distance: >3 FB  Neck ROM: full  No difficulty expected  Dental          Pulmonary - normal exam    breath sounds clear to auscultation  (+) ,shortness of breath  Cardiovascular - normal exam    Patient on routine beta blocker  Rhythm: regular  Rate: normal    (+) hypertension 2 medications or greater, dysrhythmias Atrial Fib, Paroxysmal Atrial Fib      Neuro/Psych  GI/Hepatic/Renal/Endo    (+) diabetes mellitus type 2 well controlled    Musculoskeletal     Abdominal  - normal exam   Substance History      OB/GYN          Other      history of cancer    ROS/Med Hx Other: Cardiac Clearance- WINNIE Evans (7/18/2024)    From cardiology note- EKG reviewed by me and shows sinus rhythm with nonspecific T wave abnormalities, rate 85. When compared to EKG from 2/6/2023, the T wave abnormalities are less prominent today    Eliquis last dose - 8/02/2024                    Anesthesia Plan    ASA 3     general   total IV anesthesia  (Total IV Anesthesia    Patient understands anesthesia not responsible for dental damage.  )  intravenous induction     Anesthetic plan, risks, benefits, and alternatives have been provided, discussed and informed consent has been obtained with: patient.  Pre-procedure education provided  Plan discussed with CRNA.      CODE STATUS:

## 2024-08-05 ENCOUNTER — HOSPITAL ENCOUNTER (OUTPATIENT)
Facility: HOSPITAL | Age: 64
Setting detail: HOSPITAL OUTPATIENT SURGERY
Discharge: HOME | End: 2024-08-05
Attending: SURGERY | Admitting: SURGERY
Payer: COMMERCIAL

## 2024-08-05 ENCOUNTER — ANESTHESIA (OUTPATIENT)
Dept: GASTROENTEROLOGY | Facility: HOSPITAL | Age: 64
End: 2024-08-05
Payer: COMMERCIAL

## 2024-08-05 VITALS
HEART RATE: 61 BPM | SYSTOLIC BLOOD PRESSURE: 108 MMHG | DIASTOLIC BLOOD PRESSURE: 58 MMHG | WEIGHT: 185.19 LBS | TEMPERATURE: 98.4 F | RESPIRATION RATE: 14 BRPM | BODY MASS INDEX: 28.16 KG/M2 | OXYGEN SATURATION: 97 %

## 2024-08-05 DIAGNOSIS — Z12.11 SCREENING FOR MALIGNANT NEOPLASM OF COLON: ICD-10-CM

## 2024-08-05 LAB — GLUCOSE BLDC GLUCOMTR-MCNC: 186 MG/DL (ref 70–99)

## 2024-08-05 PROCEDURE — 25810000003 LACTATED RINGERS PER 1000 ML

## 2024-08-05 PROCEDURE — 25010000002 PROPOFOL 10 MG/ML EMULSION: Performed by: NURSE ANESTHETIST, CERTIFIED REGISTERED

## 2024-08-05 PROCEDURE — 82948 REAGENT STRIP/BLOOD GLUCOSE: CPT

## 2024-08-05 RX ORDER — ONDANSETRON 4 MG/1
4 TABLET, ORALLY DISINTEGRATING ORAL ONCE AS NEEDED
Status: DISCONTINUED | OUTPATIENT
Start: 2024-08-05 | End: 2024-08-05 | Stop reason: HOSPADM

## 2024-08-05 RX ORDER — ONDANSETRON 2 MG/ML
4 INJECTION INTRAMUSCULAR; INTRAVENOUS ONCE AS NEEDED
Status: DISCONTINUED | OUTPATIENT
Start: 2024-08-05 | End: 2024-08-05 | Stop reason: HOSPADM

## 2024-08-05 RX ORDER — SODIUM CHLORIDE, SODIUM LACTATE, POTASSIUM CHLORIDE, CALCIUM CHLORIDE 600; 310; 30; 20 MG/100ML; MG/100ML; MG/100ML; MG/100ML
30 INJECTION, SOLUTION INTRAVENOUS CONTINUOUS
Status: DISCONTINUED | OUTPATIENT
Start: 2024-08-05 | End: 2024-08-05 | Stop reason: HOSPADM

## 2024-08-05 RX ORDER — SODIUM CHLORIDE 0.9 % (FLUSH) 0.9 %
10 SYRINGE (ML) INJECTION AS NEEDED
Status: DISCONTINUED | OUTPATIENT
Start: 2024-08-05 | End: 2024-08-05 | Stop reason: HOSPADM

## 2024-08-05 RX ORDER — LIDOCAINE HYDROCHLORIDE 20 MG/ML
INJECTION, SOLUTION EPIDURAL; INFILTRATION; INTRACAUDAL; PERINEURAL AS NEEDED
Status: DISCONTINUED | OUTPATIENT
Start: 2024-08-05 | End: 2024-08-05 | Stop reason: SURG

## 2024-08-05 RX ORDER — PROPOFOL 10 MG/ML
VIAL (ML) INTRAVENOUS AS NEEDED
Status: DISCONTINUED | OUTPATIENT
Start: 2024-08-05 | End: 2024-08-05 | Stop reason: SURG

## 2024-08-05 RX ORDER — SODIUM CHLORIDE 9 MG/ML
40 INJECTION, SOLUTION INTRAVENOUS AS NEEDED
Status: DISCONTINUED | OUTPATIENT
Start: 2024-08-05 | End: 2024-08-05 | Stop reason: HOSPADM

## 2024-08-05 RX ORDER — SODIUM CHLORIDE 0.9 % (FLUSH) 0.9 %
3 SYRINGE (ML) INJECTION EVERY 12 HOURS SCHEDULED
Status: DISCONTINUED | OUTPATIENT
Start: 2024-08-05 | End: 2024-08-05 | Stop reason: HOSPADM

## 2024-08-05 RX ADMIN — SODIUM CHLORIDE, POTASSIUM CHLORIDE, SODIUM LACTATE AND CALCIUM CHLORIDE 30 ML/HR: 600; 310; 30; 20 INJECTION, SOLUTION INTRAVENOUS at 10:17

## 2024-08-05 RX ADMIN — PROPOFOL 165 MCG/KG/MIN: 10 INJECTION, EMULSION INTRAVENOUS at 10:34

## 2024-08-05 RX ADMIN — LIDOCAINE HYDROCHLORIDE 40 MG: 20 INJECTION, SOLUTION EPIDURAL; INFILTRATION; INTRACAUDAL; PERINEURAL at 10:35

## 2024-08-05 RX ADMIN — PROPOFOL 80 MG: 10 INJECTION, EMULSION INTRAVENOUS at 10:35

## 2024-08-05 NOTE — H&P
[facility]   HISTORY AND PHYSICAL    Patient Name: Caitlyn Nugent  : 1960  MRN: [id number]  Primary Care Physician:  Abel Garcia MD  Date of admission: 2024    Subjective   Subjective     Chief Complaint: Colon cancer screening    HPI:    Caitlyn Nugent is a 63 y.o. female who presents for colon cancer screening.    Review of Systems   Respiratory:  Negative for shortness of breath.    Cardiovascular:  Negative for chest pain.       Personal History     Past Medical History:   Diagnosis Date    Cancer Basal cancer on my face.    Diabetes mellitus     Hypertension        Past Surgical History:   Procedure Laterality Date     SECTION      x2    POSTPARTUM TUBAL LIGATION      TONSILLECTOMY         Family History: family history includes Breast cancer in her mother; Cancer in her mother; Diabetes in her father and mother; Hypertension in her father and mother; Kidney disease in her father; Lung cancer in her mother. Otherwise pertinent FHx was reviewed and not pertinent to current issue.    Social History:  reports that she has never smoked. She has never used smokeless tobacco. She reports that she does not currently use alcohol. She reports that she does not use drugs.    Home Medications:  B-12, DULoxetine, FreeStyle Steffi 3 Sensor, Zinc, apixaban, atorvastatin, calcium carbonate, carvedilol, cholecalciferol, dilTIAZem CD, empagliflozin, insulin glargine, levocetirizine, metFORMIN, multivitamin with minerals, omeprazole, and polyethylene glycol    Allergies:  No Known Allergies    Objective    Objective     Vitals:        Physical Exam  HENT:      Head: Normocephalic.   Cardiovascular:      Rate and Rhythm: Normal rate.   Pulmonary:      Effort: Pulmonary effort is normal.   Abdominal:      Palpations: Abdomen is soft.   Musculoskeletal:         General: Normal range of motion.      Cervical back: Normal range of motion.   Skin:     General: Skin is warm.   Neurological:      General:  No focal deficit present.      Mental Status: She is alert.   Psychiatric:         Mood and Affect: Mood normal.         Result Review    Result Review:  I have personally reviewed the results from the time of this admission to 8/5/2024 09:32 EDT and agree with these findings:  []  Laboratory  []  Microbiology  []  Radiology  []  EKG/Telemetry   []  Cardiology/Vascular   []  Pathology  []  Old records  []  Other:  Most notable findings include:     Assessment & Plan   Assessment / Plan     Brief Patient Summary:  Caitlyn Nugent is a 63 y.o. female who presents for colon cancer screening.    Active Hospital Problems:  Active Hospital Problems    Diagnosis     **Screening for malignant neoplasm of colon        Plan:   We will proceed with a screening colonoscopy.  Risk benefits and alternatives were explained.    VTE Prophylaxis:  No VTE prophylaxis order currently exists.        CODE STATUS:         Admission Status:  I believe this patient meets outpatient status.    Electronically signed by Wilbert Baires MD, 08/05/24, 9:32 AM EDT.

## 2024-08-05 NOTE — ANESTHESIA POSTPROCEDURE EVALUATION
Patient: Caitlyn Nugent    Procedure Summary       Date: 08/05/24 Room / Location: [facility] / [facility]    Anesthesia Start: 1033 Anesthesia Stop: 1107    Procedure: COLONOSCOPY Diagnosis:       Screening for malignant neoplasm of colon      (Screening for malignant neoplasm of colon [Z12.11])    Surgeons: Wilbert Baires MD Provider: Jeffrey Jimenez CRNA    Anesthesia Type: general ASA Status: 3            Anesthesia Type: general    Vitals  Vitals Value Taken Time   /58 08/05/24 1119   Temp 36.9 °C (98.4 °F) 08/05/24 1119   Pulse 63 08/05/24 1120   Resp 14 08/05/24 1119   SpO2 96 % 08/05/24 1120   Vitals shown include unfiled device data.        Post Anesthesia Care and Evaluation    Post-procedure mental status: acceptable.  Pain management: satisfactory to patient    Airway patency: patent  Anesthetic complications: No anesthetic complications    Cardiovascular status: acceptable  Respiratory status: acceptable    Comments: Per chart review

## 2024-09-13 ENCOUNTER — TELEPHONE (OUTPATIENT)
Dept: SURGERY | Facility: CLINIC | Age: 64
End: 2024-09-13
Payer: COMMERCIAL

## 2024-09-13 NOTE — TELEPHONE ENCOUNTER
----- Message from April Dipak sent at 9/10/2024  6:46 PM EDT -----  10 year colon recall. [facility]

## 2024-11-14 ENCOUNTER — OFFICE VISIT (OUTPATIENT)
Dept: DIABETES SERVICES | Facility: HOSPITAL | Age: 64
End: 2024-11-14
Payer: COMMERCIAL

## 2024-11-14 VITALS
BODY MASS INDEX: 30.13 KG/M2 | SYSTOLIC BLOOD PRESSURE: 136 MMHG | HEIGHT: 68 IN | HEART RATE: 65 BPM | OXYGEN SATURATION: 94 % | DIASTOLIC BLOOD PRESSURE: 61 MMHG | WEIGHT: 198.8 LBS

## 2024-11-14 DIAGNOSIS — E11.40 TYPE 2 DIABETES MELLITUS WITH DIABETIC NEUROPATHY, WITH LONG-TERM CURRENT USE OF INSULIN: ICD-10-CM

## 2024-11-14 DIAGNOSIS — Z79.4 TYPE 2 DIABETES MELLITUS WITH DIABETIC NEUROPATHY, WITH LONG-TERM CURRENT USE OF INSULIN: ICD-10-CM

## 2024-11-14 DIAGNOSIS — E11.649 CONTROLLED TYPE 2 DIABETES MELLITUS WITH HYPOGLYCEMIA, WITH LONG-TERM CURRENT USE OF INSULIN: ICD-10-CM

## 2024-11-14 DIAGNOSIS — Z79.4 CONTROLLED TYPE 2 DIABETES MELLITUS WITH HYPOGLYCEMIA, WITH LONG-TERM CURRENT USE OF INSULIN: ICD-10-CM

## 2024-11-14 DIAGNOSIS — R73.09 HEMOGLOBIN A1C LESS THAN 7.0%: Primary | ICD-10-CM

## 2024-11-14 DIAGNOSIS — E66.9 OBESITY (BMI 30-39.9): ICD-10-CM

## 2024-11-14 LAB
EXPIRATION DATE: ABNORMAL
GLUCOSE BLDC GLUCOMTR-MCNC: 105 MG/DL (ref 70–99)
HBA1C MFR BLD: 6.8 % (ref 4.5–5.7)
Lab: ABNORMAL

## 2024-11-14 PROCEDURE — 82948 REAGENT STRIP/BLOOD GLUCOSE: CPT | Performed by: NURSE PRACTITIONER

## 2024-11-14 PROCEDURE — 95251 CONT GLUC MNTR ANALYSIS I&R: CPT | Performed by: NURSE PRACTITIONER

## 2024-11-14 PROCEDURE — G0463 HOSPITAL OUTPT CLINIC VISIT: HCPCS | Performed by: NURSE PRACTITIONER

## 2024-11-14 PROCEDURE — 99214 OFFICE O/P EST MOD 30 MIN: CPT | Performed by: NURSE PRACTITIONER

## 2024-11-14 PROCEDURE — 83036 HEMOGLOBIN GLYCOSYLATED A1C: CPT | Performed by: NURSE PRACTITIONER

## 2024-11-14 NOTE — PROGRESS NOTES
Chief Complaint  Diabetes (Follow up, med mgt, CGM eval, A1c eval)    Referred By: Abel Garcia MD    Subjective          Caitlyn Nugent presents to [facility] for diabetes medication management    History of Present Illness    Visit type:  follow-up  Diabetes type:  Type 2  Current diabetes status/concerns/issues:  Reports her lantus was increased to 30 units qd after discontinuing Ozempic. She stopped the Ozemic due to severe constipation. States she is trying to learn which foods she can eat. States she was drinkng a protein shake for breakfast, smoothie for lunch then a regular supper but admits she got tired of this routine. States she is still working on getting her protein daily. States her fbs is running 80-90mg/dl.   Other health concerns: No new health concerns  Current Diabetes symptoms:    Polyuria: No   Polydipsia: No   Polyphagia: No   Blurred vision: No   Excessive fatigue: No  Known Diabetes complications:  Neuropathy: Numbness and Tingling; Location: Feet  Renal:  No labs available for review will call PCP for labs  Eyes: No current eye exam available in record; Location: N/A; Last Eye Exam:  7/23 ; Location: [facility], [place]  Amputation/Wounds: None  GI: Constipation and Diarrhea  Cardiovascular: Hypertension and Hyperlipidemia  ED: N/A  Other: None  Hypoglycemia:  Level 1 hypoglycemia (54 mg/dL - 70 mg/dL); Frequency - 2% per CGM  Hypoglycemia Symptoms:   States her CGM alerts her prior to being symptomatic  Current diabetes treatment:    glucophage 1000mg bid, lantus 30 units qd, jardiance 25mg qd   Blood glucose device:  Steffi CGM  Blood glucose monitoring frequency:  Continuous per CGM  Blood glucose range/average:  The 14-day sensor report shows an average glucose of 147 mg/dL, with 75% in target range ( mgdL), 19% in the high range (181-250 mg/dL), 4% in the very high range (>250 mg/dL), 2% in the low range (54-70 mg/dL) and 0% in the very low  range (<54 mg/dL).   Glucose Source: Device Reviewed  Dietary behavior:  Limits high carb/sweet foods, Avoids sugary drinks, Number of meals each day - 2; Number of snacks each day - 1-2  Activity/Exercise:  None    Past Medical History:   Diagnosis Date    Cancer Basal cancer on my face.    Diabetes mellitus     TYPE II    Hypertension      Past Surgical History:   Procedure Laterality Date     SECTION      x2    COLONOSCOPY N/A 2024    Procedure: COLONOSCOPY;  Surgeon: Wilbert Baires MD;  Location: [facility] ENDOSCOPY;  Service: General;  Laterality: N/A;  HEMORRHOIDS    POSTPARTUM TUBAL LIGATION      TONSILLECTOMY       Family History   Problem Relation Age of Onset    Breast cancer Mother     Diabetes Mother     Lung cancer Mother     Cancer Mother         Lung, breast, brain cancer    Hypertension Mother     Diabetes Father     Hypertension Father     Kidney disease Father         Was on dialysis 8 yrs.     Social History     Socioeconomic History    Marital status:    Tobacco Use    Smoking status: Never    Smokeless tobacco: Never   Vaping Use    Vaping status: Never Used   Substance and Sexual Activity    Alcohol use: Not Currently    Drug use: Never    Sexual activity: Not Currently     Partners: Male     Birth control/protection: None, Tubal ligation     No Known Allergies    Current Outpatient Medications:     atorvastatin (LIPITOR) 20 MG tablet, Take 1 tablet by mouth Daily., Disp: , Rfl:     calcium carbonate (Calcium 600) 600 MG tablet, Take 1 tablet by mouth Daily., Disp: , Rfl:     carvedilol (COREG) 25 MG tablet, Take 1 tablet by mouth 2 (Two) Times a Day With Meals., Disp: , Rfl:     cholecalciferol (D3-1000) 25 MCG (1000 UT) tablet, Take 1 tablet by mouth Daily., Disp: , Rfl:     Continuous Glucose Sensor (FreeStyle Steffi 3 Sensor) misc, Use 1 each Every 14 (Fourteen) Days., Disp: 2 each, Rfl: 5    Cyanocobalamin (B-12) 1000 MCG capsule, Take 1,000 mcg by mouth Daily., Disp: ,  "Rfl:     dilTIAZem CD (CARDIZEM CD) 120 MG 24 hr capsule, Take 1 capsule by mouth Daily., Disp: , Rfl:     DULoxetine (CYMBALTA) 60 MG capsule, Take 1 capsule by mouth Daily., Disp: , Rfl:     Eliquis 5 MG tablet tablet, Take 1 tablet by mouth 2 (Two) Times a Day., Disp: , Rfl:     insulin glargine (Lantus) 100 UNIT/ML injection, Inject 30 Units under the skin into the appropriate area as directed Every Night., Disp: , Rfl:     Jardiance 25 MG tablet tablet, Take 1 tablet by mouth Daily., Disp: , Rfl:     levocetirizine (XYZAL) 5 MG tablet, Take 1 tablet by mouth Daily., Disp: , Rfl:     metFORMIN (GLUCOPHAGE) 1000 MG tablet, Take 1 tablet by mouth 2 (Two) Times a Day With Meals., Disp: , Rfl:     multivitamin with minerals (Centrum Silver 50+Women) tablet tablet, Take 1 tablet by mouth Daily., Disp: , Rfl:     omeprazole (priLOSEC) 40 MG capsule, Take 1 capsule by mouth Daily., Disp: , Rfl:     ZINC METHIONATE PO, Take 50 mg by mouth Daily., Disp: , Rfl:     Objective     Vitals:    11/14/24 0935   BP: 136/61   Pulse: 65   SpO2: 94%   Weight: 90.2 kg (198 lb 12.8 oz)   Height: 172.7 cm (68\")   PainSc: 0-No pain     Body mass index is 30.23 kg/m².    Physical Exam  Constitutional:       Appearance: Normal appearance. She is obese.      Comments: Obesity (BMI 30 - 39.9) Pt Current BMI = 30.23     HENT:      Head: Normocephalic and atraumatic.      Right Ear: External ear normal.      Left Ear: External ear normal.      Nose: Nose normal.   Eyes:      Extraocular Movements: Extraocular movements intact.      Conjunctiva/sclera: Conjunctivae normal.   Pulmonary:      Effort: Pulmonary effort is normal.   Musculoskeletal:         General: Normal range of motion.      Cervical back: Normal range of motion.   Skin:     General: Skin is warm and dry.   Neurological:      General: No focal deficit present.      Mental Status: She is alert and oriented to person, place, and time. Mental status is at baseline.   Psychiatric:  "        Mood and Affect: Mood normal.         Behavior: Behavior normal.         Thought Content: Thought content normal.         Judgment: Judgment normal.             Result Review :   The following data was reviewed by: WINNIE Alcazar on 11/14/2024:    Most Recent A1C          11/14/2024    09:42   HGBA1C Most Recent   Hemoglobin A1C 6.8        A1C Last 3 Results          6/13/2024    10:21 8/1/2024    11:09 11/14/2024    09:42   HGBA1C Last 3 Results   Hemoglobin A1C 6.3  6.5  6.8      A1c collected in the office today is 6.8%, indicating Controlled Type II diabetes.  This result is up from the prior result of 6.5% collected on 8/1/24     Glucose   Date Value Ref Range Status   11/14/2024 105 (H) 70 - 99 mg/dL Final     Comment:     Serial Number: [id number]:  [id number]     Point of care glucose in the office today is within normal limits for nonfasting glucose              Assessment: Patient is here today for 3-month follow-up on her diabetes.Reports her lantus was increased to 30 units qd after discontinuing Ozempic. She stopped the Ozemic due to severe constipation. States she is trying to learn which foods she can eat. States she was drinkng a protein shake for breakfast, smoothie for lunch then a regular supper but admits she got tired of this routine. States she is still working on getting her protein daily. States her fbs is running 80-90mg/dl.  Reviewed CGM report with patient the office today.The 14-day sensor report shows an average glucose of 147 mg/dL, with 75% in target range ( mgdL), 19% in the high range (181-250 mg/dL), 4% in the very high range (>250 mg/dL), 2% in the low range (54-70 mg/dL) and 0% in the very low range (<54 mg/dL).  Her A1c in the office today is 6.8% which is a slight increase from her previous A1c of 6.5% in August but still well-controlled.  In review of her CGM she was having some episodes of hypoglycemia in the overnight hours for the first half  of her CGM report but it appears it has resolved.      Diagnoses and all orders for this visit:    1. Hemoglobin A1c less than 7.0% (Primary)    2. Type 2 diabetes mellitus with diabetic neuropathy, with long-term current use of insulin    3. Controlled type 2 diabetes mellitus with hypoglycemia, with long-term current use of insulin  -     POC Glycosylated Hemoglobin (Hb A1C)    4. Obesity (BMI 30-39.9)    Other orders  -     POC Glucose        Plan: No changes were made to her plan of care today.  Instructed patient she starts to notice a trend of hypoglycemia in the overnight hours to decrease her Lantus to 27 units once daily.  Scheduled a 3-month follow-up and we will review her CGM and recheck her A1c at that time.    The patient will monitor her blood glucose levels per CGM.  If she develops problematic hyperglycemia or hypoglycemia or adverse drug reactions, she will contact the office for further instructions.        Follow Up     Return in about 3 months (around 2/14/2025) for Medication Mgmt, CGM Follow Up.    Patient was given instructions and counseling regarding her condition or for health maintenance advice. Please see specific information pulled into the AVS if appropriate.     Sarahi Galaviz, APRN  11/14/2024      Dictated Utilizing Dragon Dictation.  Please note that portions of this note were completed with a voice recognition program.  Part of this note may be an electronic transcription/translation of spoken language to printed text using the Dragon Dictation System.

## 2024-11-20 ENCOUNTER — TELEPHONE (OUTPATIENT)
Dept: DIABETES SERVICES | Facility: HOSPITAL | Age: 64
End: 2024-11-20
Payer: COMMERCIAL

## 2024-12-03 DIAGNOSIS — E11.649 CONTROLLED TYPE 2 DIABETES MELLITUS WITH HYPOGLYCEMIA, WITH LONG-TERM CURRENT USE OF INSULIN: ICD-10-CM

## 2024-12-03 DIAGNOSIS — Z79.4 CONTROLLED TYPE 2 DIABETES MELLITUS WITH HYPOGLYCEMIA, WITH LONG-TERM CURRENT USE OF INSULIN: ICD-10-CM

## 2024-12-04 RX ORDER — ACYCLOVIR 800 MG/1
TABLET ORAL
Qty: 2 EACH | Refills: 5 | Status: SHIPPED | OUTPATIENT
Start: 2024-12-04

## 2025-02-27 ENCOUNTER — OFFICE VISIT (OUTPATIENT)
Dept: DIABETES SERVICES | Facility: HOSPITAL | Age: 65
End: 2025-02-27
Payer: COMMERCIAL

## 2025-02-27 VITALS
BODY MASS INDEX: 30.86 KG/M2 | WEIGHT: 203.6 LBS | SYSTOLIC BLOOD PRESSURE: 123 MMHG | DIASTOLIC BLOOD PRESSURE: 68 MMHG | HEIGHT: 68 IN | HEART RATE: 95 BPM | OXYGEN SATURATION: 95 %

## 2025-02-27 DIAGNOSIS — Z79.4 TYPE 2 DIABETES MELLITUS WITH DIABETIC NEUROPATHY, WITH LONG-TERM CURRENT USE OF INSULIN: Primary | ICD-10-CM

## 2025-02-27 DIAGNOSIS — E11.649 CONTROLLED TYPE 2 DIABETES MELLITUS WITH HYPOGLYCEMIA, WITH LONG-TERM CURRENT USE OF INSULIN: ICD-10-CM

## 2025-02-27 DIAGNOSIS — E11.40 TYPE 2 DIABETES MELLITUS WITH DIABETIC NEUROPATHY, WITH LONG-TERM CURRENT USE OF INSULIN: Primary | ICD-10-CM

## 2025-02-27 DIAGNOSIS — Z79.4 CONTROLLED TYPE 2 DIABETES MELLITUS WITH HYPOGLYCEMIA, WITH LONG-TERM CURRENT USE OF INSULIN: ICD-10-CM

## 2025-02-27 DIAGNOSIS — E11.65 UNCONTROLLED TYPE 2 DIABETES MELLITUS WITH HYPERGLYCEMIA: ICD-10-CM

## 2025-02-27 DIAGNOSIS — E66.9 OBESITY (BMI 30-39.9): ICD-10-CM

## 2025-02-27 LAB
ALBUMIN UR-MCNC: <1.2 MG/DL
CREAT UR-MCNC: 86.3 MG/DL
EXPIRATION DATE: ABNORMAL
GLUCOSE BLDC GLUCOMTR-MCNC: 110 MG/DL (ref 70–99)
HBA1C MFR BLD: 7.3 % (ref 4.5–5.7)
Lab: ABNORMAL
MICROALBUMIN/CREAT UR: NORMAL MG/G{CREAT}

## 2025-02-27 PROCEDURE — 83036 HEMOGLOBIN GLYCOSYLATED A1C: CPT | Performed by: NURSE PRACTITIONER

## 2025-02-27 PROCEDURE — 82043 UR ALBUMIN QUANTITATIVE: CPT | Performed by: NURSE PRACTITIONER

## 2025-02-27 PROCEDURE — G0463 HOSPITAL OUTPT CLINIC VISIT: HCPCS | Performed by: NURSE PRACTITIONER

## 2025-02-27 PROCEDURE — 82570 ASSAY OF URINE CREATININE: CPT | Performed by: NURSE PRACTITIONER

## 2025-02-27 PROCEDURE — 95251 CONT GLUC MNTR ANALYSIS I&R: CPT | Performed by: NURSE PRACTITIONER

## 2025-02-27 PROCEDURE — 99214 OFFICE O/P EST MOD 30 MIN: CPT | Performed by: NURSE PRACTITIONER

## 2025-02-27 PROCEDURE — 82948 REAGENT STRIP/BLOOD GLUCOSE: CPT | Performed by: NURSE PRACTITIONER

## 2025-02-27 RX ORDER — SEMAGLUTIDE 1.34 MG/ML
0.25 INJECTION, SOLUTION SUBCUTANEOUS WEEKLY
Qty: 1.5 ML | Refills: 0 | COMMUNITY
Start: 2025-02-27

## 2025-02-27 RX ORDER — EMPAGLIFLOZIN 25 MG/1
25 TABLET, FILM COATED ORAL DAILY
Qty: 30 TABLET | Refills: 5 | Status: SHIPPED | OUTPATIENT
Start: 2025-02-27 | End: 2025-08-26

## 2025-02-27 RX ORDER — SEMAGLUTIDE 1.34 MG/ML
0.5 INJECTION, SOLUTION SUBCUTANEOUS WEEKLY
Qty: 1.5 ML | Refills: 5 | Status: SHIPPED | OUTPATIENT
Start: 2025-02-27 | End: 2025-08-14

## 2025-02-27 RX ORDER — INSULIN GLARGINE 100 [IU]/ML
35 INJECTION, SOLUTION SUBCUTANEOUS NIGHTLY
Qty: 10.5 ML | Refills: 5 | Status: SHIPPED | OUTPATIENT
Start: 2025-02-27 | End: 2025-08-26

## 2025-02-27 RX ORDER — HYDROCHLOROTHIAZIDE 12.5 MG/1
CAPSULE ORAL
Qty: 2 EACH | Refills: 5 | Status: SHIPPED | OUTPATIENT
Start: 2025-02-27

## 2025-02-27 NOTE — PROGRESS NOTES
Chief Complaint  Diabetes (Follow up, med mgt, CGM eval, A1c eval)    Referred By: Abel Garcia MD    Patient or patient representative verbalized consent for the use of Ambient Listening during the visit with  WINNIE Alcazar for chart documentation. 2/28/2025  07:30 EST    Subjective          Caitlyn Nugent presents to [facility] for diabetes medication management    History of Present Illness    History of Present Illness  The patient is a 64-year-old female who presents for a follow-up on her diabetes.    She has been managing her blood glucose levels with Lantus, administering 30 to 35 units daily, depending on her dietary intake. Additionally, she is on Jardiance 25 mg and Glucophage 1000 mg twice daily. Despite these interventions, she reports occasional hyperglycemia and suspects an increase in her A1c levels. Her morning blood glucose readings typically range from 90 to 120, although she experienced an elevated reading this morning due to late-night hunger and consumption of cereal. She does not experience polydipsia, polyuria, blurred vision, or fatigue. Her appetite remains robust, with persistent hunger following her first meal of the day. She has been off Ozempic since April 2024 and reports no significant weight gain during this period. She has a remaining supply of Ozempic 1 mg and is considering resuming its use. She recalls experiencing severe constipation while on the 1 mg dose of Ozempic, which necessitated hospitalization. However, she tolerated the lower dose well without any complications. She does not require refills for her medications at this time. She has an adequate supply of pen needles, which she purchases independently. She has been using FreeStyle Steffi 3 sensors without any issues.    She has a history of diarrhea since her teenage year which was initially diagnosed as dumping syndrome. She has not undergone cholecystectomy. She reports  that certain foods still trigger bowel movements, but the urgency has decreased compared to the past. She does not regularly use Metamucil or stool softeners, reserving them for instances of constipation.    She reports no current issues with her feet. She previously experienced severe foot pain, which was effectively managed with Cymbalta. She does not experience any neuropathic pain. She has been advised against wearing sandals due to their lack of support and has switched to Skechers sandals, which she finds comfortable.    MEDICATIONS  Current: Lantus, Jardiance, Glucophage, Cymbalta  Discontinued: Ozempic         Visit type:  follow-up  Diabetes type:  Type 2  Current diabetes status/concerns/issues: Reports her glucose levels are normally running 90 to 120 mg/dL.  Other health concerns: No new health concerns  Current Diabetes symptoms:    Polyuria: No   Polydipsia: No   Polyphagia: No   Blurred vision: No   Excessive fatigue: No  Known Diabetes complications:  Neuropathy: Numbness and Tingling; Location: Feet  Renal:  No labs available for review will call PCP for labs  Eyes: No current eye exam available in record; Location: N/A; Last Eye Exam:  7/23 ; Location: [facility]  Amputation/Wounds: None  GI: Constipation and Diarrhea  Cardiovascular: Hypertension and Hyperlipidemia  ED: N/A  Other: None  Hypoglycemia:  None reported at this time and 0% per CGM  Hypoglycemia Symptoms:  No hypoglycemia at this time  Current diabetes treatment:    glucophage 1000mg bid, lantus 30 units qd, jardiance 25mg qd   Blood glucose device:  miLibris CGM  Blood glucose monitoring frequency:  Continuous per CGM  Blood glucose range/average:  The 14-day sensor report shows an average glucose of 174 mg/dL, with 61% in target range ( mgdL), 24% in the high range (181-250 mg/dL), 15% in the very high range (>250 mg/dL), 0% in the low range (54-70 mg/dL) and 0% in the very low range (<54 mg/dL).   Glucose Source: Device  Reviewed  Dietary behavior:  Limits high carb/sweet foods, Avoids sugary drinks, Number of meals each day - 2; Number of snacks each day - 1-2  Activity/Exercise:  None    Past Medical History:   Diagnosis Date    Cancer Basal cancer on my face.    Diabetes mellitus     TYPE II    Hypertension      Past Surgical History:   Procedure Laterality Date     SECTION      x2    COLONOSCOPY N/A 2024    Procedure: COLONOSCOPY;  Surgeon: Wilbert Baires MD;  Location: [facility] END[facility];  Service: General;  Laterality: N/A;  HEMORRHOIDS    POSTPARTUM TUBAL LIGATION      TONSILLECTOMY       Family History   Problem Relation Age of Onset    Breast cancer Mother     Diabetes Mother     Lung cancer Mother     Cancer Mother         Lung, breast, brain cancer    Hypertension Mother     Diabetes Father     Hypertension Father     Kidney disease Father         Was on dialysis 8 yrs.     Social History     Socioeconomic History    Marital status:    Tobacco Use    Smoking status: Never    Smokeless tobacco: Never   Vaping Use    Vaping status: Never Used   Substance and Sexual Activity    Alcohol use: Not Currently    Drug use: Never    Sexual activity: Not Currently     Partners: Male     Birth control/protection: None, Tubal ligation     No Known Allergies    Current Outpatient Medications:     atorvastatin (LIPITOR) 20 MG tablet, Take 1 tablet by mouth Daily., Disp: , Rfl:     calcium carbonate (Calcium 600) 600 MG tablet, Take 1 tablet by mouth Daily., Disp: , Rfl:     carvedilol (COREG) 25 MG tablet, Take 1 tablet by mouth 2 (Two) Times a Day With Meals., Disp: , Rfl:     cholecalciferol (D3-1000) 25 MCG (1000 UT) tablet, Take 1 tablet by mouth Daily., Disp: , Rfl:     Cyanocobalamin (B-12) 1000 MCG capsule, Take 1,000 mcg by mouth Daily., Disp: , Rfl:     dilTIAZem CD (CARDIZEM CD) 120 MG 24 hr capsule, Take 1 capsule by mouth Daily., Disp: , Rfl:     DULoxetine (CYMBALTA) 60 MG capsule, Take 1 capsule by  "mouth Daily., Disp: , Rfl:     Eliquis 5 MG tablet tablet, Take 1 tablet by mouth 2 (Two) Times a Day., Disp: , Rfl:     insulin glargine (Lantus) 100 UNIT/ML injection, Inject 35 Units under the skin into the appropriate area as directed Every Night for 180 days., Disp: 10.5 mL, Rfl: 5    Jardiance 25 MG tablet tablet, Take 1 tablet by mouth Daily for 180 days., Disp: 30 tablet, Rfl: 5    levocetirizine (XYZAL) 5 MG tablet, Take 1 tablet by mouth Daily., Disp: , Rfl:     metFORMIN (GLUCOPHAGE) 1000 MG tablet, Take 1 tablet by mouth 2 (Two) Times a Day With Meals for 180 days., Disp: 60 tablet, Rfl: 5    multivitamin with minerals (Centrum Silver 50+Women) tablet tablet, Take 1 tablet by mouth Daily., Disp: , Rfl:     omeprazole (priLOSEC) 40 MG capsule, Take 1 capsule by mouth Daily., Disp: , Rfl:     ZINC METHIONATE PO, Take 50 mg by mouth Daily., Disp: , Rfl:     Continuous Glucose Sensor (FreeStyle Steffi 3 Plus Sensor), Use Every 30 (Thirty) Days. Apply as directed every 15 days, Disp: 2 each, Rfl: 5    Semaglutide,0.25 or 0.5MG/DOS, (Ozempic, 0.25 or 0.5 MG/DOSE,) 2 MG/1.5ML solution pen-injector, Inject 0.5 mg under the skin into the appropriate area as directed 1 (One) Time Per Week for 168 days., Disp: 1.5 mL, Rfl: 5    Semaglutide,0.25 or 0.5MG/DOS, (Ozempic, 0.25 or 0.5 MG/DOSE,) 2 MG/1.5ML solution pen-injector, Inject 0.25 mg under the skin into the appropriate area as directed 1 (One) Time Per Week., Disp: 1.5 mL, Rfl: 0    Objective     Vitals:    02/27/25 1105   BP: 123/68   Pulse: 95   SpO2: 95%   Weight: 92.4 kg (203 lb 9.6 oz)   Height: 172.7 cm (68\")   PainSc: 0-No pain     Body mass index is 30.96 kg/m².    Physical Exam  Constitutional:       Appearance: Normal appearance. She is normal weight. She is obese.      Comments: Obesity (BMI 30 - 39.9) Pt Current BMI = 30.96     HENT:      Head: Normocephalic and atraumatic.      Right Ear: External ear normal.      Left Ear: External ear normal.      " Nose: Nose normal.   Eyes:      Extraocular Movements: Extraocular movements intact.      Conjunctiva/sclera: Conjunctivae normal.   Pulmonary:      Effort: Pulmonary effort is normal.   Musculoskeletal:         General: Normal range of motion.      Cervical back: Normal range of motion.   Skin:     General: Skin is warm and dry.   Neurological:      General: No focal deficit present.      Mental Status: She is alert and oriented to person, place, and time. Mental status is at baseline.   Psychiatric:         Mood and Affect: Mood normal.         Behavior: Behavior normal.         Thought Content: Thought content normal.         Judgment: Judgment normal.         Diabetic Foot Exam Performed and Monofilament Test Performed  Diabetic foot exam:   Left: Filament test present   Pulses Dorsalis Pedis:  present  Posterior Tibial:  present   Vibratory sensation normal   Proprioception normal   Sharp/dull discrimination normal       Right: Filament test present   Pulses Dorsalis Pedis:  present  Posterior Tibial:  present   Vibratory sensation normal   Proprioception normal   Sharp/dull discrimination normal     Result Review :   The following data was reviewed by: WINNIE Alcazar on 02/27/2025:    Most Recent A1C          2/27/2025    11:21   HGBA1C Most Recent   Hemoglobin A1C 7.3        A1C Last 3 Results          8/1/2024    11:09 11/14/2024    09:42 2/27/2025    11:21   HGBA1C Last 3 Results   Hemoglobin A1C 6.5  6.8  7.3      A1c collected in the office today is 7.3%, indicating Uncontrolled Type II diabetes.  This result is up from the prior result of 6.8% collected on 11/14/24     Glucose   Date Value Ref Range Status   02/27/2025 110 (H) 70 - 99 mg/dL Final     Comment:     Serial Number: [id number]:  [id number]     Point of care glucose in the office today is within normal limits for nonfasting glucose      Microalbumin, Urine   Date Value Ref Range Status   08/01/2024 <1.2 mg/dL Final     No  "results found for: \"CREATININEUR\"  No results found for: \"MALBCRERATIO\"  Urine microalbuminuria collected on 8/1/24 is negative for microalbuminuria                Diagnoses and all orders for this visit:    1. Type 2 diabetes mellitus with diabetic neuropathy, with long-term current use of insulin (Primary)  -     POC Glycosylated Hemoglobin (Hb A1C)  -     Microalbumin / Creatinine Urine Ratio - Urine, Clean Catch; Future  -     Microalbumin / Creatinine Urine Ratio - Urine, Clean Catch  -     Jardiance 25 MG tablet tablet; Take 1 tablet by mouth Daily for 180 days.  Dispense: 30 tablet; Refill: 5  -     insulin glargine (Lantus) 100 UNIT/ML injection; Inject 35 Units under the skin into the appropriate area as directed Every Night for 180 days.  Dispense: 10.5 mL; Refill: 5  -     Continuous Glucose Sensor (FreeStyle Steffi 3 Plus Sensor); Use Every 30 (Thirty) Days. Apply as directed every 15 days  Dispense: 2 each; Refill: 5  -     metFORMIN (GLUCOPHAGE) 1000 MG tablet; Take 1 tablet by mouth 2 (Two) Times a Day With Meals for 180 days.  Dispense: 60 tablet; Refill: 5  -     Semaglutide,0.25 or 0.5MG/DOS, (Ozempic, 0.25 or 0.5 MG/DOSE,) 2 MG/1.5ML solution pen-injector; Inject 0.5 mg under the skin into the appropriate area as directed 1 (One) Time Per Week for 168 days.  Dispense: 1.5 mL; Refill: 5  -     Semaglutide,0.25 or 0.5MG/DOS, (Ozempic, 0.25 or 0.5 MG/DOSE,) 2 MG/1.5ML solution pen-injector; Inject 0.25 mg under the skin into the appropriate area as directed 1 (One) Time Per Week.  Dispense: 1.5 mL; Refill: 0    2. Uncontrolled type 1 diabetes mellitus with hyperglycemia    3. Controlled type 2 diabetes mellitus with hypoglycemia, with long-term current use of insulin    Other orders  -     POC Glucose          Assessment & Plan  1. Diabetes Mellitus.  Her A1c level has increased to 7.3 from previous readings of 6.8 and 6.5. She reports morning blood sugars ranging from  mg/dL, with occasional " higher readings due to dietary choices. She is currently on Lantus 30-35 units daily, Jardiance 25 mg, and Glucophage 1000 mg twice a day. She has experienced weight gain since discontinuing Ozempic in April 2024. She will start Ozempic at 0.25 mg once weekly for 4 weeks, then increase to 0.5 mg once weekly.  Once she is on the 0.5 mg dose she can use her existing 1 mg pen by dialing 36 clicks to achieve the 0.5 mg dose. A prescription for Ozempic 0.5 mg has been provided for 30 days. She is advised to take Metamucil, 2 teaspoons daily, to prevent constipation. If constipation occurs, she should discontinue Ozempic and inform the clinic. Prescriptions for all her medications have been renewed for 30 days with 5 refills. She will transition to FreeStyle Steffi 3+ sensors, which offer an additional day of use.    2. Constipation.  She experienced severe constipation with the 1 mg dose of Ozempic previously. She will start Ozempic at a lower dose and take Metamucil, 2 teaspoons daily, to prevent constipation. If constipation occurs again, she will discontinue Ozempic and notify the clinic.    3. Neuropathy.  She reports that Cymbalta has effectively managed her neuropathy symptoms. No new symptoms or pain were noted during the visit.    Follow-up  The patient will follow up in 3 months.      The patient will monitor her blood glucose levels per continuous glucose monitor.  If she develops problematic hyperglycemia or hypoglycemia or adverse drug reactions, she will contact the office for further instructions.        Follow Up     Return in about 3 months (around 5/27/2025) for Medication Mgmt, CGM Follow Up.    Patient was given instructions and counseling regarding her condition or for health maintenance advice. Please see specific information pulled into the AVS if appropriate.     Sarahi Galaviz, APRN  02/27/2025      Dictated Utilizing Dragon Dictation.  Please note that portions of this note were completed  with a voice recognition program.  Part of this note may be an electronic transcription/translation of spoken language to printed text using the Dragon Dictation System.

## 2025-02-27 NOTE — PATIENT INSTRUCTIONS
Start Ozempic 0.25 mg once weekly x 4 weeks then increase to 0.5 mg once weekly thereafter.  You can finish your 1 mg dose pen that you have by doing 36 clicks to equal 0.5 mg    Start Metamucil 2 tsp daily

## 2025-04-23 ENCOUNTER — TRANSCRIBE ORDERS (OUTPATIENT)
Dept: ADMINISTRATIVE | Facility: HOSPITAL | Age: 65
End: 2025-04-23
Payer: COMMERCIAL

## 2025-04-23 DIAGNOSIS — Z12.31 BREAST CANCER SCREENING BY MAMMOGRAM: Primary | ICD-10-CM

## 2025-05-09 ENCOUNTER — OFFICE VISIT (OUTPATIENT)
Dept: SURGERY | Facility: CLINIC | Age: 65
End: 2025-05-09
Payer: COMMERCIAL

## 2025-05-09 VITALS — HEIGHT: 68 IN | BODY MASS INDEX: 30.9 KG/M2 | WEIGHT: 203.9 LBS

## 2025-05-09 DIAGNOSIS — K64.1 GRADE II HEMORRHOIDS: Primary | ICD-10-CM

## 2025-05-09 RX ORDER — SODIUM CHLORIDE 0.9 % (FLUSH) 0.9 %
10 SYRINGE (ML) INJECTION EVERY 12 HOURS SCHEDULED
OUTPATIENT
Start: 2025-05-09

## 2025-05-09 RX ORDER — SODIUM CHLORIDE 0.9 % (FLUSH) 0.9 %
10 SYRINGE (ML) INJECTION AS NEEDED
OUTPATIENT
Start: 2025-05-09

## 2025-05-09 RX ORDER — SODIUM CHLORIDE 9 MG/ML
40 INJECTION, SOLUTION INTRAVENOUS AS NEEDED
OUTPATIENT
Start: 2025-05-09

## 2025-05-09 RX ORDER — ONDANSETRON 2 MG/ML
4 INJECTION INTRAMUSCULAR; INTRAVENOUS EVERY 6 HOURS PRN
OUTPATIENT
Start: 2025-05-09

## 2025-05-09 NOTE — PROGRESS NOTES
Inpatient History and Physical Surgical Orders    Preadmission Location:   Preadmission Time:  Facility:  Surgery Date:  Surgery Time:  Preadmission Test date:     Chief Complaint  Outpatient History and Physical / Surgical Orders    Primary Care Provider: Abel Garcia MD    Referring Provider: Taylor Kirkland APRN Subjective      Patient Name: Caitlyn Nugent : 1960    HPI  The patient is a 64-year-old female that we have taken care of in the past.  She has known hemorrhoids.  They recently have become more uncomfortable and she is having more trouble keeping clean.  Occasionally she will have a little bit of hematochezia but she is not having significant bleeding.    Past History:  Medical History: has a past medical history of Cancer (Basal cancer on my face.), Diabetes mellitus, Gestational diabetes (), Hypertension, and Type 2 diabetes mellitus.   Surgical History: has a past surgical history that includes  section; postpartum tubal ligation; Tonsillectomy; and Colonoscopy (N/A, 2024).   Family History: family history includes Breast cancer in her mother; Cancer in her mother; Diabetes in her father and mother; Hypertension in her father and mother; Kidney disease in her father; Lung cancer in her mother; Obesity in her father.   Social History: reports that she has never smoked. She has never used smokeless tobacco. She reports that she does not currently use alcohol. She reports that she does not use drugs.  Allergies: Patient has no known allergies.       Current Outpatient Medications:     atorvastatin (LIPITOR) 20 MG tablet, Take 1 tablet by mouth Daily., Disp: , Rfl:     calcium carbonate (Calcium 600) 600 MG tablet, Take 1 tablet by mouth Daily., Disp: , Rfl:     carvedilol (COREG) 25 MG tablet, Take 1 tablet by mouth 2 (Two) Times a Day With Meals., Disp: , Rfl:     cholecalciferol (D3-1000) 25 MCG (1000 UT) tablet, Take 1 tablet by mouth Daily., Disp: , Rfl:      "Continuous Glucose Sensor (FreeStyle Steffi 3 Plus Sensor), Use Every 30 (Thirty) Days. Apply as directed every 15 days, Disp: 2 each, Rfl: 5    Cyanocobalamin (B-12) 1000 MCG capsule, Take 1,000 mcg by mouth Daily., Disp: , Rfl:     dilTIAZem CD (CARDIZEM CD) 120 MG 24 hr capsule, Take 1 capsule by mouth Daily., Disp: , Rfl:     DULoxetine (CYMBALTA) 60 MG capsule, Take 1 capsule by mouth Daily., Disp: , Rfl:     Eliquis 5 MG tablet tablet, Take 1 tablet by mouth 2 (Two) Times a Day., Disp: , Rfl:     insulin glargine (Lantus) 100 UNIT/ML injection, Inject 35 Units under the skin into the appropriate area as directed Every Night for 180 days., Disp: 10.5 mL, Rfl: 5    Jardiance 25 MG tablet tablet, Take 1 tablet by mouth Daily for 180 days., Disp: 30 tablet, Rfl: 5    levocetirizine (XYZAL) 5 MG tablet, Take 1 tablet by mouth Daily., Disp: , Rfl:     metFORMIN (GLUCOPHAGE) 1000 MG tablet, Take 1 tablet by mouth 2 (Two) Times a Day With Meals for 180 days., Disp: 60 tablet, Rfl: 5    multivitamin with minerals (Centrum Silver 50+Women) tablet tablet, Take 1 tablet by mouth Daily., Disp: , Rfl:     omeprazole (priLOSEC) 40 MG capsule, Take 1 capsule by mouth Daily., Disp: , Rfl:     Semaglutide,0.25 or 0.5MG/DOS, (Ozempic, 0.25 or 0.5 MG/DOSE,) 2 MG/1.5ML solution pen-injector, Inject 0.5 mg under the skin into the appropriate area as directed 1 (One) Time Per Week for 168 days., Disp: 1.5 mL, Rfl: 5    ZINC METHIONATE PO, Take 50 mg by mouth Daily., Disp: , Rfl:     Semaglutide,0.25 or 0.5MG/DOS, (Ozempic, 0.25 or 0.5 MG/DOSE,) 2 MG/1.5ML solution pen-injector, Inject 0.25 mg under the skin into the appropriate area as directed 1 (One) Time Per Week. (Patient not taking: Reported on 5/9/2025), Disp: 1.5 mL, Rfl: 0       Objective   Vital Signs:   Ht 172.7 cm (67.99\")   Wt 92.5 kg (203 lb 14.4 oz)   BMI 31.01 kg/m²       Physical Exam  Vitals and nursing note reviewed.   Constitutional:       Appearance: Normal " appearance. The patient is well-developed.   Cardiovascular:      Rate and Rhythm: Normal rate and regular rhythm.   Pulmonary:      Effort: Pulmonary effort is normal.      Breath sounds: Normal air entry.   Abdominal:      General: Bowel sounds are normal.      Palpations: Abdomen is soft.      Skin:     General: Skin is warm and dry.   Neurological:      Mental Status: The patient is alert and oriented to person, place, and time.      Motor: Motor function is intact.   Psychiatric:         Mood and Affect: Mood normal.   Rectal: She has a large external hemorrhoid that is visible today.    Result Review :               Assessment and Plan   Diagnoses and all orders for this visit:    1. Grade II hemorrhoids (Primary)  -     Case Request; Standing  -     Follow Anesthesia Guidelines / Protocol; Future  -     Follow Anesthesia Guidelines / Protocol; Standing  -     Verify NPO Status; Standing  -     Verify / Perform Chlorhexidine Skin Prep; Standing  -     Insert Peripheral IV; Standing  -     Saline Lock & Maintain IV Access; Standing  -     sodium chloride 0.9 % flush 10 mL  -     sodium chloride 0.9 % flush 10 mL  -     sodium chloride 0.9 % infusion 40 mL  -     Place Sequential Compression Device; Standing  -     Maintain Sequential Compression Device; Standing  -     ondansetron (ZOFRAN) injection 4 mg  -     ceFAZolin (ANCEF) 2,000 mg in sodium chloride 0.9 % 100 mL IVPB    We will schedule her for a surgical hemorrhoidectomy.  I have described the procedure to her as well as the risk and benefits and she is agreeable to proceeding.    I  Wilbert Baires MD  05/09/2025

## 2025-05-20 RX ORDER — CETIRIZINE HYDROCHLORIDE 10 MG/1
10 TABLET ORAL DAILY
COMMUNITY

## 2025-05-20 NOTE — PRE-PROCEDURE INSTRUCTIONS
PATIENT INSTRUCTED TO BE:    - NOTHING TO EAT AFTER MIDNIGHT OR CHEW, EXCEPT CAN HAVE CLEAR LIQUIDS 2 HOURS PRIOR TO SURGERY ARRIVAL TIME , NO MORE THAN 8 OZ. (NOTHING RED)     - TO HOLD ALL VITAMINS, SUPPLEMENTS, NSAIDS FOR ONE WEEK PRIOR TO THEIR SURGICAL PROCEDURE    - DO NOT TAKE _________Ozempic_____________ 7 DAYS PRIOR TO PROCEDURE PER ANESTHESIA RECOMMENDATIONS/INSTRUCTIONS     - BATHING INSTRUCTIONS GIVEN    INSTRUCTED NO LOTIONS, JEWELRY, PIERCINGS,  NAIL POLISH, OR DEODORANT DAY OF SURGERY    - IF DIABETIC, CHECK BLOOD GLUCOSE IF LESS THAN 70 OR HAVING SYMPTOMS CALL THE PREOP AREA FOR INSTRUCTIONS ON AM OF SURGERY ([phone] )    -INSTRUCTED TO TAKE THE FOLLOWING MEDICATIONS THE DAY OF SURGERY WITH SIPS OF WATER:   Cymbalta, Zyrtec, Carvedilol, Diltiazem, Prilosec        - DO NOT BRING ANY MEDICATIONS WITH YOU TO THE HOSPITAL THE DAY OF SURGERY, EXCEPT IF USE INHALERS. BRING INHALERS DAY OF SURGERY       - BRING CPAP OR BIPAP TO THE HOSPITAL ONLY IF YOU ARE SPENDING THE NIGHT    - DO NOT SMOKE OR VAPE 24 HOURS PRIOR TO PROCEDURE PER ANESTHESIA REQUEST     -MAKE SURE YOU HAVE A RIDE HOME OR SOMEONE TO STAY WITH YOU THE DAY OF THE PROCEDURE AFTER YOU GO HOME     - FOLLOW ANY OTHER INSTRUCTIONS GIVEN TO YOU BY YOUR SURGEON'S OFFICE.     Main Entrance [facility], Take elevator to first floor, turn left and check in at registration     - YOU WILL RECEIVE A PHONE CALL THE DAY PRIOR TO SURGERY BETWEEN 1PM AND 4 PM WITH ARRIVAL TIME, IF YOUR SURGERY IS ON A MONDAY YOU WILL RECEIVE A CALL THE FRIDAY PRIOR TO SURGERY DATE    - BRING CASH OR CREDIT CARD FOR COPAYMENT OF MEDICATIONS AFTER SURGERY IF YOU USE THE HOSPITAL PHARMACY (MEDS TO BED)    - PREADMISSION TESTING NURSE TEX SPANGLER [phone] IF HAVE ANY QUESTIONS     -PATIENT PROVIDED THE NUMBER FOR PREOP SURGICAL DEPT IF HAD QUESTIONS AFTER HOURS PRIOR TO SURGERY ([phone] OR ).  INFORMED PT IF NO ANSWER, LEAVE A MESSAGE AND SOMEONE WILL  RETURN THEIR CALL       PATIENT VERBALIZED UNDERSTANDING

## 2025-05-21 ENCOUNTER — ANESTHESIA (OUTPATIENT)
Dept: PERIOP | Facility: HOSPITAL | Age: 65
End: 2025-05-21
Payer: COMMERCIAL

## 2025-05-21 ENCOUNTER — HOSPITAL ENCOUNTER (OUTPATIENT)
Facility: HOSPITAL | Age: 65
Setting detail: HOSPITAL OUTPATIENT SURGERY
Discharge: HOME | End: 2025-05-21
Attending: SURGERY | Admitting: SURGERY
Payer: COMMERCIAL

## 2025-05-21 ENCOUNTER — ANESTHESIA EVENT (OUTPATIENT)
Dept: PERIOP | Facility: HOSPITAL | Age: 65
End: 2025-05-21
Payer: COMMERCIAL

## 2025-05-21 VITALS
SYSTOLIC BLOOD PRESSURE: 144 MMHG | RESPIRATION RATE: 18 BRPM | BODY MASS INDEX: 31.56 KG/M2 | WEIGHT: 201.06 LBS | TEMPERATURE: 98 F | OXYGEN SATURATION: 94 % | HEIGHT: 67 IN | HEART RATE: 67 BPM | DIASTOLIC BLOOD PRESSURE: 73 MMHG

## 2025-05-21 DIAGNOSIS — K64.1 GRADE II HEMORRHOIDS: ICD-10-CM

## 2025-05-21 LAB
GLUCOSE BLDC GLUCOMTR-MCNC: 164 MG/DL (ref 70–99)
GLUCOSE BLDC GLUCOMTR-MCNC: 97 MG/DL (ref 70–99)
QT INTERVAL: 412 MS
QTC INTERVAL: 442 MS

## 2025-05-21 PROCEDURE — 82948 REAGENT STRIP/BLOOD GLUCOSE: CPT

## 2025-05-21 PROCEDURE — 46320 REMOVAL OF HEMORRHOID CLOT: CPT | Performed by: SURGERY

## 2025-05-21 PROCEDURE — 25010000002 PROPOFOL 10 MG/ML EMULSION: Performed by: ANESTHESIOLOGY

## 2025-05-21 PROCEDURE — 93005 ELECTROCARDIOGRAM TRACING: CPT | Performed by: ANESTHESIOLOGY

## 2025-05-21 PROCEDURE — 25010000002 DEXAMETHASONE PER 1 MG: Performed by: ANESTHESIOLOGY

## 2025-05-21 PROCEDURE — 25010000002 BUPIVACAINE (PF) 0.5 % SOLUTION: Performed by: SURGERY

## 2025-05-21 PROCEDURE — 25010000002 MIDAZOLAM PER 1MG: Performed by: ANESTHESIOLOGY

## 2025-05-21 PROCEDURE — 25010000002 HYDROMORPHONE 1 MG/ML SOLUTION: Performed by: SURGERY

## 2025-05-21 PROCEDURE — 25010000002 ONDANSETRON PER 1 MG: Performed by: NURSE ANESTHETIST, CERTIFIED REGISTERED

## 2025-05-21 PROCEDURE — 88304 TISSUE EXAM BY PATHOLOGIST: CPT | Performed by: SURGERY

## 2025-05-21 PROCEDURE — 82948 REAGENT STRIP/BLOOD GLUCOSE: CPT | Performed by: ANESTHESIOLOGY

## 2025-05-21 PROCEDURE — 25010000002 LIDOCAINE HCL (PF) 4 % SOLUTION: Performed by: ANESTHESIOLOGY

## 2025-05-21 PROCEDURE — 25010000002 FENTANYL CITRATE (PF) 50 MCG/ML SOLUTION: Performed by: ANESTHESIOLOGY

## 2025-05-21 PROCEDURE — 25810000003 LACTATED RINGERS PER 1000 ML: Performed by: ANESTHESIOLOGY

## 2025-05-21 PROCEDURE — 25010000002 CEFAZOLIN PER 500 MG: Performed by: SURGERY

## 2025-05-21 RX ORDER — SODIUM CHLORIDE 0.9 % (FLUSH) 0.9 %
10 SYRINGE (ML) INJECTION AS NEEDED
Status: DISCONTINUED | OUTPATIENT
Start: 2025-05-21 | End: 2025-05-21 | Stop reason: HOSPADM

## 2025-05-21 RX ORDER — ONDANSETRON 4 MG/1
4 TABLET, ORALLY DISINTEGRATING ORAL ONCE AS NEEDED
Status: DISCONTINUED | OUTPATIENT
Start: 2025-05-21 | End: 2025-05-21 | Stop reason: HOSPADM

## 2025-05-21 RX ORDER — SODIUM CHLORIDE 9 MG/ML
40 INJECTION, SOLUTION INTRAVENOUS AS NEEDED
Status: DISCONTINUED | OUTPATIENT
Start: 2025-05-21 | End: 2025-05-21 | Stop reason: HOSPADM

## 2025-05-21 RX ORDER — ONDANSETRON 2 MG/ML
4 INJECTION INTRAMUSCULAR; INTRAVENOUS EVERY 6 HOURS PRN
Status: DISCONTINUED | OUTPATIENT
Start: 2025-05-21 | End: 2025-05-21 | Stop reason: HOSPADM

## 2025-05-21 RX ORDER — BUPIVACAINE HYDROCHLORIDE 5 MG/ML
INJECTION, SOLUTION EPIDURAL; INTRACAUDAL; PERINEURAL AS NEEDED
Status: DISCONTINUED | OUTPATIENT
Start: 2025-05-21 | End: 2025-05-21 | Stop reason: HOSPADM

## 2025-05-21 RX ORDER — ONDANSETRON 2 MG/ML
4 INJECTION INTRAMUSCULAR; INTRAVENOUS ONCE AS NEEDED
Status: DISCONTINUED | OUTPATIENT
Start: 2025-05-21 | End: 2025-05-21 | Stop reason: HOSPADM

## 2025-05-21 RX ORDER — MIDAZOLAM HYDROCHLORIDE 2 MG/2ML
2 INJECTION, SOLUTION INTRAMUSCULAR; INTRAVENOUS ONCE
Status: COMPLETED | OUTPATIENT
Start: 2025-05-21 | End: 2025-05-21

## 2025-05-21 RX ORDER — HYDROCODONE BITARTRATE AND ACETAMINOPHEN 5; 325 MG/1; MG/1
1 TABLET ORAL EVERY 6 HOURS PRN
Qty: 10 TABLET | Refills: 0 | Status: SHIPPED | OUTPATIENT
Start: 2025-05-21

## 2025-05-21 RX ORDER — PROPOFOL 10 MG/ML
VIAL (ML) INTRAVENOUS AS NEEDED
Status: DISCONTINUED | OUTPATIENT
Start: 2025-05-21 | End: 2025-05-21 | Stop reason: SURG

## 2025-05-21 RX ORDER — ONDANSETRON 2 MG/ML
INJECTION INTRAMUSCULAR; INTRAVENOUS AS NEEDED
Status: DISCONTINUED | OUTPATIENT
Start: 2025-05-21 | End: 2025-05-21 | Stop reason: SURG

## 2025-05-21 RX ORDER — LIDOCAINE HYDROCHLORIDE 40 MG/ML
INJECTION, SOLUTION RETROBULBAR AS NEEDED
Status: DISCONTINUED | OUTPATIENT
Start: 2025-05-21 | End: 2025-05-21 | Stop reason: SURG

## 2025-05-21 RX ORDER — HYDROCODONE BITARTRATE AND ACETAMINOPHEN 5; 325 MG/1; MG/1
1 TABLET ORAL ONCE AS NEEDED
Status: DISCONTINUED | OUTPATIENT
Start: 2025-05-21 | End: 2025-05-21 | Stop reason: HOSPADM

## 2025-05-21 RX ORDER — SODIUM CHLORIDE, SODIUM LACTATE, POTASSIUM CHLORIDE, CALCIUM CHLORIDE 600; 310; 30; 20 MG/100ML; MG/100ML; MG/100ML; MG/100ML
9 INJECTION, SOLUTION INTRAVENOUS CONTINUOUS PRN
Status: DISCONTINUED | OUTPATIENT
Start: 2025-05-21 | End: 2025-05-21 | Stop reason: HOSPADM

## 2025-05-21 RX ORDER — DEXAMETHASONE SODIUM PHOSPHATE 4 MG/ML
INJECTION, SOLUTION INTRA-ARTICULAR; INTRALESIONAL; INTRAMUSCULAR; INTRAVENOUS; SOFT TISSUE AS NEEDED
Status: DISCONTINUED | OUTPATIENT
Start: 2025-05-21 | End: 2025-05-21 | Stop reason: SURG

## 2025-05-21 RX ORDER — FENTANYL CITRATE 50 UG/ML
INJECTION, SOLUTION INTRAMUSCULAR; INTRAVENOUS AS NEEDED
Status: DISCONTINUED | OUTPATIENT
Start: 2025-05-21 | End: 2025-05-21 | Stop reason: SURG

## 2025-05-21 RX ORDER — EPHEDRINE SULFATE 50 MG/ML
INJECTION INTRAVENOUS AS NEEDED
Status: DISCONTINUED | OUTPATIENT
Start: 2025-05-21 | End: 2025-05-21 | Stop reason: SURG

## 2025-05-21 RX ORDER — SODIUM CHLORIDE 0.9 % (FLUSH) 0.9 %
10 SYRINGE (ML) INJECTION EVERY 12 HOURS SCHEDULED
Status: DISCONTINUED | OUTPATIENT
Start: 2025-05-21 | End: 2025-05-21 | Stop reason: HOSPADM

## 2025-05-21 RX ORDER — MAGNESIUM HYDROXIDE 1200 MG/15ML
LIQUID ORAL AS NEEDED
Status: DISCONTINUED | OUTPATIENT
Start: 2025-05-21 | End: 2025-05-21 | Stop reason: HOSPADM

## 2025-05-21 RX ORDER — IBUPROFEN 600 MG/1
600 TABLET, FILM COATED ORAL EVERY 6 HOURS PRN
Status: DISCONTINUED | OUTPATIENT
Start: 2025-05-21 | End: 2025-05-21 | Stop reason: HOSPADM

## 2025-05-21 RX ORDER — ACETAMINOPHEN 500 MG
1000 TABLET ORAL ONCE
Status: COMPLETED | OUTPATIENT
Start: 2025-05-21 | End: 2025-05-21

## 2025-05-21 RX ADMIN — HYDROMORPHONE HYDROCHLORIDE 0.5 MG: 1 INJECTION, SOLUTION INTRAMUSCULAR; INTRAVENOUS; SUBCUTANEOUS at 13:00

## 2025-05-21 RX ADMIN — FENTANYL CITRATE 25 MCG: 50 INJECTION, SOLUTION INTRAMUSCULAR; INTRAVENOUS at 11:50

## 2025-05-21 RX ADMIN — MIDAZOLAM HYDROCHLORIDE 2 MG: 1 INJECTION, SOLUTION INTRAMUSCULAR; INTRAVENOUS at 11:38

## 2025-05-21 RX ADMIN — SODIUM CHLORIDE, POTASSIUM CHLORIDE, SODIUM LACTATE AND CALCIUM CHLORIDE 9 ML/HR: 600; 310; 30; 20 INJECTION, SOLUTION INTRAVENOUS at 09:42

## 2025-05-21 RX ADMIN — SODIUM CHLORIDE 2000 MG: 9 INJECTION, SOLUTION INTRAVENOUS at 11:47

## 2025-05-21 RX ADMIN — ACETAMINOPHEN 1000 MG: 500 TABLET ORAL at 09:42

## 2025-05-21 RX ADMIN — PROPOFOL 100 MG: 10 INJECTION, EMULSION INTRAVENOUS at 11:50

## 2025-05-21 RX ADMIN — HYDROMORPHONE HYDROCHLORIDE 0.5 MG: 1 INJECTION, SOLUTION INTRAMUSCULAR; INTRAVENOUS; SUBCUTANEOUS at 12:56

## 2025-05-21 RX ADMIN — ONDANSETRON 4 MG: 2 INJECTION INTRAMUSCULAR; INTRAVENOUS at 12:41

## 2025-05-21 RX ADMIN — EPHEDRINE SULFATE 5 MG: 50 INJECTION INTRAVENOUS at 12:31

## 2025-05-21 RX ADMIN — LIDOCAINE HYDROCHLORIDE 60 MG: 40 INJECTION, SOLUTION RETROBULBAR; TOPICAL at 11:50

## 2025-05-21 RX ADMIN — DEXAMETHASONE SODIUM PHOSPHATE 4 MG: 4 INJECTION, SOLUTION INTRAMUSCULAR; INTRAVENOUS at 11:50

## 2025-05-21 RX ADMIN — FENTANYL CITRATE 25 MCG: 50 INJECTION, SOLUTION INTRAMUSCULAR; INTRAVENOUS at 12:26

## 2025-05-21 NOTE — ANESTHESIA POSTPROCEDURE EVALUATION
Patient: Caitlyn Nugent    Procedure Summary       Date: 05/21/25 Room / Location: [facility] OR 11 / [facility] MAIN OR    Anesthesia Start: 1147 Anesthesia Stop: 1251    Procedure: Hemorrhoidectomy-surgically removed hemorrhoid tissue from anal canal (Anus) Diagnosis:       Grade II hemorrhoids      (Grade II hemorrhoids [K64.1])    Surgeons: Wilbert Baires MD Provider: Alyssa Trimble MD    Anesthesia Type: general ASA Status: 3            Anesthesia Type: general    Vitals  Vitals Value Taken Time   /87 05/21/25 13:17   Temp 36.4 °C (97.5 °F) 05/21/25 13:15   Pulse 69 05/21/25 13:17   Resp 16 05/21/25 13:10   SpO2 94 % 05/21/25 13:17   Vitals shown include unfiled device data.        Post Anesthesia Care and Evaluation    Patient location during evaluation: bedside  Patient participation: complete - patient participated  Level of consciousness: awake  Pain management: adequate    Airway patency: patent  PONV Status: none  Cardiovascular status: acceptable and stable  Respiratory status: acceptable  Hydration status: acceptable

## 2025-05-21 NOTE — DISCHARGE INSTRUCTIONS
DISCHARGE INSTRUCTIONS  SURGICAL / AMBULATORY  PROCEDURES      For your surgery you had:  General anesthesia (you may have a sore throat for the first 24 hours)  Local anesthesia    You may experience dizziness, drowsiness, or light-headedness for several hours following surgery/procedure.  Do not stay alone today or tonight.  Limit your activity for 24 hours.  Resume your diet slowly.  Follow whatever special dietary instructions you may have been given by your doctor.  You should not drive or operate machinery, drink alcohol, or sign legally binding documents for 24 hours or while you are taking pain medication.    NOTIFY YOUR DOCTOR IF YOU EXPERIENCE ANY OF THE FOLLOWING:  Temperature greater than 101 degrees Fahrenheit  Shaking Chills  Redness or excessive drainage from incision  Nausea, vomiting and/or pain that is not controlled by prescribed medications  Increase in bleeding or bleeding that is excessive  Unable to urinate in 6 hours after surgery  If unable to reach your doctor, please go to the closest Emergency Room  It is important to avoid constipation at this time so the physician will prescribe stool softeners. Eating a high-fiber diet and drinking plenty of liquids also helps. A small to moderate amount of bleeding, usually when having a bowel movement, may occur for a week or two following the surgery. This is normal and should stop when the anus and rectum heal.  Heavy lifting should be avoided for 2-3 weeks.  An ice pack can help reduce swelling. Soaking in a sitz bat (a shallow bath of warm water) several times a day helps ease the discomfort. Using a donut ring (cushion with a hole in the middle) can make sitting upright more comfortable.  May shower tomorrow, sitz bath as needed.    SPECIAL INSTRUCTIONS:      Last dose of pain medication was given at:  TYLENOL 1000 MG AT 9:42 AM

## 2025-05-21 NOTE — OP NOTE
HEMORRHOIDECTOMY  Procedure Report    Patient Name:  Caitlyn Nugent  YOB: 1960    Date of Surgery:  5/21/2025     Indications: The patient is a 64-year-old female who presented with a large symptomatic external hemorrhoid.  The decision was made to proceed with a rectal exam under anesthesia and hemorrhoidectomy.    Pre-op Diagnosis: Enlarged external hemorrhoid    Post-Op Diagnosis: Same    Procedure/CPT® Codes:    Excision of external hemorrhoid    Staff:  Surgeon(s):  Wilbert Baires MD    Assistant: Zainab Ray RN CSA    Anesthesia: General    Estimated Blood Loss: minimal    Implants:    Nothing was implanted during the procedure    Specimen:          Specimens       ID Source Type Tests Collected By Collected At Frozen?    A Hemorrhoid(s) Tissue TISSUE PATHOLOGY EXAM   Wilbert Baires MD 5/21/25 1230     Description: external hemorrhoid                Findings: Enlarged external hemorrhoid anteriorly    Complications: None    Description of Procedure: The patient was taken the operating room and placed on the table in supine position.  After induction of general anesthesia, the abdomen was prepped and draped sterilely.  A digital rectal exam was performed and was noted to be normal.  We then did an anoscopic exam and did not identify significant internal hemorrhoid disease.  She had 1 enlarged external hemorrhoid anteriorly at 12:00.  I went ahead and excised that hemorrhoid with a 15 blade scalpel and the LigaSure.  We achieved adequate hemostasis and then closed the mucosa with interrupted 3-0 Chromic Gut sutures.  The hemorrhoidectomy site was anesthetized with quarter percent Marcaine.  Sterile dressings were applied and she was taken the postanesthesia recovery room in stable condition.    Assistant: Zainab Ray RN CSA  was responsible for performing the following activities: Retraction, Suction, and Placing Dressing and their skilled assistance was necessary for the  success of this case.    Wilbert Baires MD     Date: 5/21/2025  Time: 12:48 EDT

## 2025-05-21 NOTE — ANESTHESIA PREPROCEDURE EVALUATION
Anesthesia Evaluation     Patient summary reviewed and Nursing notes reviewed   no history of anesthetic complications:   NPO Solid Status: > 8 hours  NPO Liquid Status: > 2 hours           Airway   Mallampati: III  TM distance: >3 FB  Neck ROM: full  Possible difficult intubation  Dental - normal exam     Pulmonary - normal exam    breath sounds clear to auscultation  (+) ,shortness of breath  Cardiovascular - normal exam  Exercise tolerance: good (4-7 METS)    ECG reviewed  PT is on anticoagulation therapy  Patient on routine beta blocker and Beta blocker given within 24 hours of surgery  Rhythm: regular  Rate: normal    (+) hypertension (coreg, diltiazem) 2 medications or greater, dysrhythmias Paroxysmal Atrial Fib, hyperlipidemia    ROS comment: Eliquis (LD: 25)    EK bpm  Sinus rhythm  Low voltage, precordial leads  Nonspecific T abnormalities, anterior leads  Date and Time of Study:2025 09:27:04      Neuro/Psych- negative ROS  GI/Hepatic/Renal/Endo    (+) GERD, diabetes mellitus (Ozempic (LD: 25); sensor on left upper arm) type 2 well controlled    Musculoskeletal (-) negative ROS    Abdominal   (+) obese   Substance History - negative use     OB/GYN negative ob/gyn ROS         Other      history of cancer (skin) remission    ROS/Med Hx Other: PAT Nursing Notes unavailable.                     Anesthesia Plan    ASA 3     general     (Patient understands anesthesia not responsible for dental damage.)  intravenous induction     Anesthetic plan, risks, benefits, and alternatives have been provided, discussed and informed consent has been obtained with: patient and spouse/significant other.    Use of blood products discussed with patient .    Plan discussed with CRNA.        CODE STATUS:

## 2025-05-22 LAB
CYTO UR: NORMAL
LAB AP CASE REPORT: NORMAL
LAB AP CLINICAL INFORMATION: NORMAL
PATH REPORT.FINAL DX SPEC: NORMAL
PATH REPORT.GROSS SPEC: NORMAL

## 2025-06-06 ENCOUNTER — OFFICE VISIT (OUTPATIENT)
Dept: SURGERY | Facility: CLINIC | Age: 65
End: 2025-06-06
Payer: COMMERCIAL

## 2025-06-06 VITALS — BODY MASS INDEX: 31.86 KG/M2 | WEIGHT: 203 LBS | HEIGHT: 67 IN

## 2025-06-06 DIAGNOSIS — K64.1 GRADE II HEMORRHOIDS: Primary | ICD-10-CM

## 2025-06-06 PROCEDURE — 99024 POSTOP FOLLOW-UP VISIT: CPT | Performed by: SURGERY

## 2025-06-06 NOTE — PROGRESS NOTES
Chief Complaint  Post-op (Hemorrhoidectomy )    Subjective          Caitlyn Nugent presents to [facility] GENERAL SURGERY  History of Present Illness    Caitlyn Nugent is a 64 y.o. female  who presents today for a postoperative visit.     Patient is here for a follow-up after an excision of an enlarged external hemorrhoid.  She is doing well and had no complaints today.    Past History:  Medical History: has a past medical history of Acid reflux, Cancer (Basal cancer on my face.), Diabetes mellitus, Gestational diabetes (), Hyperlipidemia, Hypertension, Paroxysmal A-fib, Seasonal allergies, and Type 2 diabetes mellitus.   Surgical History: has a past surgical history that includes  section; postpartum tubal ligation; Tonsillectomy; Colonoscopy (N/A, 2024); Hemorrhoid surgery (N/A, 2025); and Hemorrhoid surgery (2025).   Family History: family history includes Breast cancer in her mother; Cancer in her mother; Diabetes in her father and mother; Hypertension in her father and mother; Kidney disease in her father; Lung cancer in her mother; Obesity in her father.   Social History: reports that she has never smoked. She has never used smokeless tobacco. She reports that she does not currently use alcohol. She reports that she does not use drugs.  Allergies: Patient has no known allergies.       Current Outpatient Medications:     atorvastatin (LIPITOR) 20 MG tablet, Take 1 tablet by mouth Every Night., Disp: , Rfl:     calcium carbonate (Calcium 600) 600 MG tablet, Take 1 tablet by mouth Daily., Disp: , Rfl:     carvedilol (COREG) 25 MG tablet, Take 1 tablet by mouth 2 (Two) Times a Day With Meals., Disp: , Rfl:     cetirizine (zyrTEC) 10 MG tablet, Take 1 tablet by mouth Daily., Disp: , Rfl:     cholecalciferol (D3-1000) 25 MCG (1000 UT) tablet, Take 1 tablet by mouth Daily., Disp: , Rfl:     Continuous Glucose Sensor (FreeStyle Steffi 3 Plus Sensor), Use Every 30  "(Thirty) Days. Apply as directed every 15 days, Disp: 2 each, Rfl: 5    Cyanocobalamin (B-12) 1000 MCG capsule, Take 1,000 mcg by mouth Daily., Disp: , Rfl:     dilTIAZem CD (CARDIZEM CD) 120 MG 24 hr capsule, Take 1 capsule by mouth Daily., Disp: , Rfl:     DULoxetine (CYMBALTA) 60 MG capsule, Take 1 capsule by mouth Daily., Disp: , Rfl:     Eliquis 5 MG tablet tablet, Take 1 tablet by mouth 2 (Two) Times a Day., Disp: , Rfl:     insulin glargine (Lantus) 100 UNIT/ML injection, Inject 35 Units under the skin into the appropriate area as directed Every Night for 180 days., Disp: 10.5 mL, Rfl: 5    Jardiance 25 MG tablet tablet, Take 1 tablet by mouth Daily for 180 days., Disp: 30 tablet, Rfl: 5    metFORMIN (GLUCOPHAGE) 1000 MG tablet, Take 1 tablet by mouth 2 (Two) Times a Day With Meals for 180 days., Disp: 60 tablet, Rfl: 5    multivitamin with minerals (Centrum Silver 50+Women) tablet tablet, Take 1 tablet by mouth Daily., Disp: , Rfl:     omeprazole (priLOSEC) 40 MG capsule, Take 1 capsule by mouth Daily., Disp: , Rfl:     Semaglutide,0.25 or 0.5MG/DOS, (Ozempic, 0.25 or 0.5 MG/DOSE,) 2 MG/1.5ML solution pen-injector, Inject 0.5 mg under the skin into the appropriate area as directed 1 (One) Time Per Week for 168 days., Disp: 1.5 mL, Rfl: 5    ZINC METHIONATE PO, Take 50 mg by mouth Daily., Disp: , Rfl:     HYDROcodone-acetaminophen (NORCO) 5-325 MG per tablet, Take 1 tablet by mouth Every 6 (Six) Hours As Needed for Moderate Pain (Pain). (Patient not taking: Reported on 6/6/2025), Disp: 10 tablet, Rfl: 0       Physical Exam  There is still some swelling there anteriorly but otherwise everything looks fine.  Objective     Vital Signs:   Ht 170.2 cm (67\")   Wt 92.1 kg (203 lb)   BMI 31.79 kg/m²              Assessment and Plan    Diagnoses and all orders for this visit:    1. Grade II hemorrhoids (Primary)    Overall she is very happy and is feeling pretty good.  I will see her back on an as-needed " basis.

## 2025-06-11 LAB
QT INTERVAL: 412 MS
QTC INTERVAL: 442 MS

## 2025-06-25 NOTE — PROGRESS NOTES
Chief Complaint  Diabetes (Follow up, med mgt, CGM eval, A1c eval)    Referred By: Abel Garcia MD    Patient or patient representative verbalized consent for the use of Ambient Listening during the visit with  WINNIE Alcazar for chart documentation. 6/26/2025  08:49 EDT    Subjective          Caitlyn Nugent presents to [facility] for diabetes medication management    History of Present Illness    History of Present Illness  The patient presents for evaluation of diabetes.    She reports poor glycemic control, attributing it to her recent hemorrhoidectomy and the associated stress. She is on blood thinners. She identifies as a comfort eater, consuming food when anxious or upset. She experiences frequent thirst and urination but does not report any symptoms of blurred vision or fatigue. She has been on an increased dose of Lantus, 40 units, for the past month and has not experienced any hypoglycemic episodes. Her morning blood glucose levels are typically elevated, ranging from 100 to 140, even with a pre-bedtime snack to prevent nocturnal hypoglycemia. She also notes that her blood glucose levels can increase from 110 to 140 within an hour or two without any food intake. She is currently on Jardiance 25 mg, Ozempic 0.5 mg, and metformin 1000 mg twice daily. She has requested her primary care physician to prescribe all her diabetic medications, while she prefers to have her sensor refills provided by this office. She anticipates transitioning to Medicare in 10/2025. She was prescribed hydrocodone for post-hemorrhoidectomy pain but did not take it.    PAST SURGICAL HISTORY:  Hemorrhoidectomy         Visit type:  follow-up  Diabetes type:  Type 2  Current diabetes status/concerns/issues: Reports he had to have a hemorrhoidectomy and she is a comfort eater and admits her diet has not been the best lately.  States she increased her Lantus from 30 units once daily to 40  units once daily and elevated glucose levels.  Other health concerns: Reports she had to have a hemorrhoidectomy recently.  Current Diabetes symptoms:    Polyuria: Yes     Polydipsia: Yes     Polyphagia: No   Blurred vision: No   Excessive fatigue: No  Known Diabetes complications:  Neuropathy: Numbness and Tingling; Location: Feet  Renal:  No labs available for review will call PCP for labs  Eyes: No current eye exam available in record; Location: N/A; Last Eye Exam:   ; Location: [facility]  Amputation/Wounds: None  GI: Constipation and Diarrhea  Cardiovascular: Hypertension and Hyperlipidemia  ED: N/A  Other: None  Hypoglycemia:  None reported at this time and 0% per CGM  Hypoglycemia Symptoms:  No hypoglycemia at this time  Current diabetes treatment:  glucophage 1000mg bid, lantus 40 units qd, jardiance 25mg qd, Ozempic 0.5mg once wk  Blood glucose device:  Steffi CGM  Blood glucose monitoring frequency:  Continuous per CGM  Blood glucose range/average:  The 14-day sensor report shows an average glucose of 154 mg/dL, with 74% in target range ( mgdL), 21% in the high range (181-250 mg/dL), 5% in the very high range (>250 mg/dL), 0% in the low range (54-70 mg/dL) and 0% in the very low range (<54 mg/dL).   Glucose Source: Device Reviewed  Dietary behavior:  Limits high carb/sweet foods, Avoids sugary drinks, Number of meals each day - 2; Number of snacks each day - 1-2  Activity/Exercise:  None    Past Medical History:   Diagnosis Date    Acid reflux     Cancer Basal cancer on my face.    Diabetes mellitus     TYPE II, ave BS AM     Gestational diabetes     Hyperlipidemia     Hypertension     Paroxysmal A-fib     follows with Dr. Lore Epps    Seasonal allergies     Type 2 diabetes mellitus      Past Surgical History:   Procedure Laterality Date     SECTION      x2    COLONOSCOPY N/A 2024    Procedure: COLONOSCOPY;  Surgeon: Wilbert Baires MD;  Location: [facility]  ENDOSCOPY;  Service: General;  Laterality: N/A;  HEMORRHOIDS    HEMORRHOIDECTOMY N/A 05/21/2025    Procedure: Hemorrhoidectomy-surgically removed hemorrhoid tissue from anal canal;  Surgeon: Wilbert Baires MD;  [facility];  Service: General;  Laterality: N/A;    HEMORRHOIDECTOMY  5/21/2025    POSTPARTUM TUBAL LIGATION      TONSILLECTOMY       Family History   Problem Relation Age of Onset    Breast cancer Mother     Diabetes Mother     Lung cancer Mother     Cancer Mother         Lung, breast, brain cancer    Hypertension Mother     Diabetes Father     Hypertension Father     Kidney disease Father         Was on dialysis 8 yrs.    Obesity Father     Malig Hyperthermia Neg Hx      Social History     Socioeconomic History    Marital status:    Tobacco Use    Smoking status: Never    Smokeless tobacco: Never   Vaping Use    Vaping status: Never Used   Substance and Sexual Activity    Alcohol use: Not Currently    Drug use: Never    Sexual activity: Not Currently     Partners: Male     Birth control/protection: Tubal ligation     No Known Allergies    Current Outpatient Medications:     atorvastatin (LIPITOR) 20 MG tablet, Take 1 tablet by mouth Every Night., Disp: , Rfl:     calcium carbonate (Calcium 600) 600 MG tablet, Take 1 tablet by mouth Daily., Disp: , Rfl:     carvedilol (COREG) 25 MG tablet, Take 1 tablet by mouth 2 (Two) Times a Day With Meals., Disp: , Rfl:     cetirizine (zyrTEC) 10 MG tablet, Take 1 tablet by mouth Daily., Disp: , Rfl:     cholecalciferol (D3-1000) 25 MCG (1000 UT) tablet, Take 1 tablet by mouth Daily., Disp: , Rfl:     Continuous Glucose Sensor (FreeStyle Steffi 3 Plus Sensor), Use Every 30 (Thirty) Days. Apply as directed every 15 days, Disp: 2 each, Rfl: 5    Cyanocobalamin (B-12) 1000 MCG capsule, Take 1,000 mcg by mouth Daily., Disp: , Rfl:     dilTIAZem CD (CARDIZEM CD) 120 MG 24 hr capsule, Take 1 capsule by mouth Daily., Disp: , Rfl:     DULoxetine (CYMBALTA)  "60 MG capsule, Take 1 capsule by mouth Daily., Disp: , Rfl:     Eliquis 5 MG tablet tablet, Take 1 tablet by mouth 2 (Two) Times a Day., Disp: , Rfl:     insulin glargine (Lantus) 100 UNIT/ML injection, Inject 40 Units under the skin into the appropriate area as directed Every Night for 180 days., Disp: , Rfl:     Jardiance 25 MG tablet tablet, Take 1 tablet by mouth Daily for 180 days., Disp: 30 tablet, Rfl: 5    metFORMIN (GLUCOPHAGE) 1000 MG tablet, Take 1 tablet by mouth 2 (Two) Times a Day With Meals for 180 days., Disp: 60 tablet, Rfl: 5    multivitamin with minerals (Centrum Silver 50+Women) tablet tablet, Take 1 tablet by mouth Daily., Disp: , Rfl:     omeprazole (priLOSEC) 40 MG capsule, Take 1 capsule by mouth Daily., Disp: , Rfl:     Semaglutide,0.25 or 0.5MG/DOS, (Ozempic, 0.25 or 0.5 MG/DOSE,) 2 MG/1.5ML solution pen-injector, Inject 0.5 mg under the skin into the appropriate area as directed 1 (One) Time Per Week for 168 days., Disp: 1.5 mL, Rfl: 5    ZINC METHIONATE PO, Take 50 mg by mouth Daily., Disp: , Rfl:     Objective     Vitals:    06/26/25 0847   BP: 120/68   Pulse: 74   SpO2: 96%   Weight: 92.1 kg (203 lb)   Height: 170.2 cm (67\")   PainSc: 0-No pain     Body mass index is 31.79 kg/m².    Physical Exam  Constitutional:       Appearance: Normal appearance. She is obese.      Comments: Obesity (BMI 30 - 39.9) Pt Current BMI = 31.79     HENT:      Head: Normocephalic and atraumatic.      Right Ear: External ear normal.      Left Ear: External ear normal.      Nose: Nose normal.   Eyes:      Extraocular Movements: Extraocular movements intact.      Conjunctiva/sclera: Conjunctivae normal.   Pulmonary:      Effort: Pulmonary effort is normal.   Musculoskeletal:         General: Normal range of motion.      Cervical back: Normal range of motion.   Skin:     General: Skin is warm and dry.   Neurological:      General: No focal deficit present.      Mental Status: She is alert and oriented to person, " place, and time. Mental status is at baseline.   Psychiatric:         Mood and Affect: Mood normal.         Behavior: Behavior normal.         Thought Content: Thought content normal.         Judgment: Judgment normal.             Result Review :   The following data was reviewed by: WINNIE Alcazar on 06/26/2025:    Most Recent A1C          6/26/2025    08:51   HGBA1C Most Recent   Hemoglobin A1C 7.3        A1C Last 3 Results          11/14/2024    09:42 2/27/2025    11:21 6/26/2025    08:51   HGBA1C Last 3 Results   Hemoglobin A1C 6.8  7.3  7.3      A1c collected in the office today is 7.3%, indicating Uncontrolled Type II diabetes.  This result is unchanged from the prior result of 7.3% collected on 2/27/25.     Glucose   Date Value Ref Range Status   06/26/2025 125 (H) 70 - 99 mg/dL Final     Comment:     Serial Number: [id number]:  [id number]     Point of care glucose in the office today is within normal limits for nonfasting glucose      Microalbumin, Urine   Date Value Ref Range Status   02/27/2025 <1.2 mg/dL Final   08/01/2024 <1.2 mg/dL Final     Creatinine, Urine   Date Value Ref Range Status   02/27/2025 86.3 mg/dL Final     Microalbumin/Creatinine Ratio   Date Value Ref Range Status   02/27/2025   Final     Comment:     Unable to calculate     Urine microalbuminuria collected on 2/27/2025 is negative for microalbuminuria                Diagnoses and all orders for this visit:    1. Type 2 diabetes mellitus with diabetic neuropathy, with long-term current use of insulin (Primary)  -     POC Glycosylated Hemoglobin (Hb A1C)  -     Continuous Glucose Sensor (FreeStyle Steffi 3 Plus Sensor); Use Every 30 (Thirty) Days. Apply as directed every 15 days  Dispense: 2 each; Refill: 5  -     insulin glargine (Lantus) 100 UNIT/ML injection; Inject 40 Units under the skin into the appropriate area as directed Every Night for 180 days.    2. Obesity (BMI 30-39.9)    3. Uncontrolled type 2 diabetes  mellitus with hyperglycemia    Other orders  -     POC Glucose          Assessment & Plan  1. Diabetes mellitus.  - Her average blood glucose level is 154, with 74% of readings within the target range. There are no instances of hypoglycemia or severe hyperglycemia, but 21% of readings indicate hyperglycemia and 5% indicate severe hyperglycemia.  - Current A1c level is 7.3, consistent with the reading from 02/2025, but an increase from the 6.8 recorded in 11/2024. Today's blood glucose level is 125.  - The Nighat phenomenon was discussed as a potential cause of her elevated morning blood glucose levels.  - She will maintain her current regimen of Ozempic 0.5 mg due to intolerance to higher doses. A prescription for a 30-day supply of sensors has been provided.  Discussed decreasing her carbohydrate and sugar intake.  Her A1c level will be reassessed in 3 months.      The patient will monitor her blood glucose levels per continuous glucose monitor.  If she develops problematic hyperglycemia or hypoglycemia or adverse drug reactions, she will contact the office for further instructions.        Follow Up     Return in about 3 months (around 9/26/2025) for Medication Mgmt, CGM Follow Up.    Patient was given instructions and counseling regarding her condition or for health maintenance advice. Please see specific information pulled into the AVS if appropriate.     Sarahi Galaviz, WINNIE  06/26/2025      Dictated Utilizing Dragon Dictation.  Please note that portions of this note were completed with a voice recognition program.  Part of this note may be an electronic transcription/translation of spoken language to printed text using the Dragon Dictation System.

## 2025-06-26 ENCOUNTER — OFFICE VISIT (OUTPATIENT)
Dept: DIABETES SERVICES | Facility: HOSPITAL | Age: 65
End: 2025-06-26
Payer: COMMERCIAL

## 2025-06-26 VITALS
BODY MASS INDEX: 31.86 KG/M2 | OXYGEN SATURATION: 96 % | DIASTOLIC BLOOD PRESSURE: 68 MMHG | WEIGHT: 203 LBS | HEIGHT: 67 IN | SYSTOLIC BLOOD PRESSURE: 120 MMHG | HEART RATE: 74 BPM

## 2025-06-26 DIAGNOSIS — E11.65 UNCONTROLLED TYPE 2 DIABETES MELLITUS WITH HYPERGLYCEMIA: ICD-10-CM

## 2025-06-26 DIAGNOSIS — E66.9 OBESITY (BMI 30-39.9): ICD-10-CM

## 2025-06-26 DIAGNOSIS — Z79.4 TYPE 2 DIABETES MELLITUS WITH DIABETIC NEUROPATHY, WITH LONG-TERM CURRENT USE OF INSULIN: Primary | ICD-10-CM

## 2025-06-26 DIAGNOSIS — E11.40 TYPE 2 DIABETES MELLITUS WITH DIABETIC NEUROPATHY, WITH LONG-TERM CURRENT USE OF INSULIN: Primary | ICD-10-CM

## 2025-06-26 LAB
EXPIRATION DATE: ABNORMAL
GLUCOSE BLDC GLUCOMTR-MCNC: 125 MG/DL (ref 70–99)
HBA1C MFR BLD: 7.3 % (ref 4.5–5.7)
Lab: ABNORMAL

## 2025-06-26 PROCEDURE — G0463 HOSPITAL OUTPT CLINIC VISIT: HCPCS | Performed by: NURSE PRACTITIONER

## 2025-06-26 PROCEDURE — 82948 REAGENT STRIP/BLOOD GLUCOSE: CPT | Performed by: NURSE PRACTITIONER

## 2025-06-26 PROCEDURE — 99214 OFFICE O/P EST MOD 30 MIN: CPT | Performed by: NURSE PRACTITIONER

## 2025-06-26 PROCEDURE — 95251 CONT GLUC MNTR ANALYSIS I&R: CPT | Performed by: NURSE PRACTITIONER

## 2025-06-26 PROCEDURE — 83036 HEMOGLOBIN GLYCOSYLATED A1C: CPT | Performed by: NURSE PRACTITIONER

## 2025-06-26 RX ORDER — HYDROCHLOROTHIAZIDE 12.5 MG/1
CAPSULE ORAL
Qty: 2 EACH | Refills: 5 | Status: SHIPPED | OUTPATIENT
Start: 2025-06-26

## 2025-06-26 RX ORDER — INSULIN GLARGINE 100 [IU]/ML
40 INJECTION, SOLUTION SUBCUTANEOUS NIGHTLY
Start: 2025-06-26 | End: 2025-12-23

## 2025-06-27 ENCOUNTER — PRIOR AUTHORIZATION (OUTPATIENT)
Dept: DIABETES SERVICES | Facility: HOSPITAL | Age: 65
End: 2025-06-27
Payer: COMMERCIAL

## 2025-06-27 NOTE — TELEPHONE ENCOUNTER
Per CMM    The member recently filled this medication and will be able to return for their next refill according to their plan limits.

## 2025-06-27 NOTE — TELEPHONE ENCOUNTER
PA request received from [facility] for the following medication    Continuous Glucose Sensor (FreeStyle Steffi 3 Plus Sensor) (06/26/2025)     Key: [id number]

## 2025-07-23 ENCOUNTER — HOSPITAL ENCOUNTER (OUTPATIENT)
Dept: MAMMOGRAPHY | Facility: HOSPITAL | Age: 65
Discharge: HOME | End: 2025-07-23
Admitting: NURSE PRACTITIONER
Payer: COMMERCIAL

## 2025-07-23 DIAGNOSIS — Z12.31 BREAST CANCER SCREENING BY MAMMOGRAM: ICD-10-CM

## 2025-07-23 PROCEDURE — 77063 BREAST TOMOSYNTHESIS BI: CPT

## 2025-07-23 PROCEDURE — 77067 SCR MAMMO BI INCL CAD: CPT

## (undated) DEVICE — DEV OPN LIGASURE SM/JAW 28D 16.5MM 18.8CM 1P/U

## (undated) DEVICE — PENCL SMOKE/EVAC MEGADYNE TELESCP 10FT

## (undated) DEVICE — STERILE POLYISOPRENE POWDER-FREE SURGICAL GLOVES: Brand: PROTEXIS

## (undated) DEVICE — SOLIDIFIER LIQLOC PLS 1500CC BT

## (undated) DEVICE — SOL IRR NACL 0.9PCT BO 1000ML

## (undated) DEVICE — GAUZE,SPONGE,4"X4",16PLY,STRL,LF,10/TRAY: Brand: MEDLINE

## (undated) DEVICE — INTENDED FOR TISSUE SEPARATION, AND OTHER PROCEDURES THAT REQUIRE A SHARP SURGICAL BLADE TO PUNCTURE OR CUT.: Brand: BARD-PARKER ® CARBON RIB-BACK BLADES

## (undated) DEVICE — SKIN PREP TRAY W/CHG: Brand: MEDLINE INDUSTRIES, INC.

## (undated) DEVICE — SOL IRR NACL 0.9PCT BT 1000ML

## (undated) DEVICE — GOWN,REINFRCE,POLY,SIRUS,BREATH SLV,XXLG: Brand: MEDLINE

## (undated) DEVICE — SOL IRRG H2O PL/BG 1000ML STRL

## (undated) DEVICE — SYR LL TP 10ML STRL

## (undated) DEVICE — DRAPE,UNDERBUTTOCKS,PCH,STERILE: Brand: MEDLINE

## (undated) DEVICE — THE STERILE LIGHT HANDLE COVER IS USED WITH STERIS SURGICAL LIGHTING AND VISUALIZATION SYSTEMS.

## (undated) DEVICE — Device: Brand: DEFENDO AIR/WATER/SUCTION AND BIOPSY VALVE

## (undated) DEVICE — SUT VIC 2/0 SH 27IN

## (undated) DEVICE — LEGGINGS, PAIR, CLEAR, STERILE: Brand: MEDLINE

## (undated) DEVICE — STRAP STIRUP SLP RNG 19X3.5IN DISP

## (undated) DEVICE — SLV SCD KN/LEN ADJ EXPRSS BLENDED MD 1P/U

## (undated) DEVICE — LINER SURG CANSTR SXN S/RIGD 1500CC

## (undated) DEVICE — CONN JET HYDRA H20 AUXILIARY DISP

## (undated) DEVICE — STERILE POLYISOPRENE POWDER-FREE SURGICAL GLOVES WITH EMOLLIENT COATING: Brand: PROTEXIS

## (undated) DEVICE — GLV SURG SENSICARE PI ORTHO SZ8 LF STRL

## (undated) DEVICE — Device

## (undated) DEVICE — GOWN,SIRUS,POLYRNF,BRTHSLV,2XL,18/CS: Brand: MEDLINE

## (undated) DEVICE — MINOR-LF: Brand: MEDLINE INDUSTRIES, INC.